# Patient Record
Sex: MALE | Race: WHITE | NOT HISPANIC OR LATINO
[De-identification: names, ages, dates, MRNs, and addresses within clinical notes are randomized per-mention and may not be internally consistent; named-entity substitution may affect disease eponyms.]

---

## 2017-01-30 ENCOUNTER — APPOINTMENT (OUTPATIENT)
Dept: SURGERY | Facility: CLINIC | Age: 77
End: 2017-01-30

## 2017-01-30 VITALS
SYSTOLIC BLOOD PRESSURE: 131 MMHG | WEIGHT: 183 LBS | TEMPERATURE: 97.5 F | OXYGEN SATURATION: 97 % | DIASTOLIC BLOOD PRESSURE: 77 MMHG | HEART RATE: 73 BPM | HEIGHT: 72 IN | BODY MASS INDEX: 24.79 KG/M2

## 2017-02-21 ENCOUNTER — EMERGENCY (EMERGENCY)
Facility: HOSPITAL | Age: 77
LOS: 1 days | Discharge: HOME | End: 2017-02-21
Attending: EMERGENCY MEDICINE | Admitting: EMERGENCY MEDICINE
Payer: MEDICARE

## 2017-02-21 VITALS
RESPIRATION RATE: 18 BRPM | DIASTOLIC BLOOD PRESSURE: 72 MMHG | OXYGEN SATURATION: 98 % | SYSTOLIC BLOOD PRESSURE: 125 MMHG | HEART RATE: 76 BPM | TEMPERATURE: 98 F

## 2017-02-21 VITALS
RESPIRATION RATE: 17 BRPM | DIASTOLIC BLOOD PRESSURE: 78 MMHG | OXYGEN SATURATION: 97 % | SYSTOLIC BLOOD PRESSURE: 132 MMHG | HEART RATE: 85 BPM | WEIGHT: 175.05 LBS | TEMPERATURE: 97 F

## 2017-02-21 DIAGNOSIS — Z88.8 ALLERGY STATUS TO OTHER DRUGS, MEDICAMENTS AND BIOLOGICAL SUBSTANCES STATUS: ICD-10-CM

## 2017-02-21 DIAGNOSIS — L20.9 ATOPIC DERMATITIS, UNSPECIFIED: ICD-10-CM

## 2017-02-21 DIAGNOSIS — Z95.828 PRESENCE OF OTHER VASCULAR IMPLANTS AND GRAFTS: Chronic | ICD-10-CM

## 2017-02-21 DIAGNOSIS — Z98.89 OTHER SPECIFIED POSTPROCEDURAL STATES: Chronic | ICD-10-CM

## 2017-02-21 DIAGNOSIS — Z88.1 ALLERGY STATUS TO OTHER ANTIBIOTIC AGENTS STATUS: ICD-10-CM

## 2017-02-21 DIAGNOSIS — Z88.0 ALLERGY STATUS TO PENICILLIN: ICD-10-CM

## 2017-02-21 DIAGNOSIS — Z79.891 LONG TERM (CURRENT) USE OF OPIATE ANALGESIC: ICD-10-CM

## 2017-02-21 DIAGNOSIS — R21 RASH AND OTHER NONSPECIFIC SKIN ERUPTION: ICD-10-CM

## 2017-02-21 DIAGNOSIS — Z79.899 OTHER LONG TERM (CURRENT) DRUG THERAPY: ICD-10-CM

## 2017-02-21 PROCEDURE — 99282 EMERGENCY DEPT VISIT SF MDM: CPT

## 2017-02-21 NOTE — ED PROVIDER NOTE - SKIN, MLM
L hand: a 4 cm area of thin, hyperpigmented skin to dorsum on hand - consistent w/atopic dermatitis, no swelling, no warmth, no tend, no vesicles, no pustules, no bleeding; a 2 mm scab to R lateral knee w/o swelling, pus, no SJS, no TENS, no target lesions

## 2017-02-21 NOTE — ED PROVIDER NOTE - MEDICAL DECISION MAKING DETAILS
pt w/a rash to L hand - consistent w/atopic dermatitis, no signs of inf, likely result of using latex gloves frequently - advised to use hydrocortisone cr, stop latex glove use and f/u w/derm

## 2017-02-21 NOTE — ED PROVIDER NOTE - OBJECTIVE STATEMENT
The pt is a 75 y/o M, who presents to ED c/o rash to L hand x few wks. States that has been wearing latex gloves a lot because is a germanophobe, rash itchy at times, admits to scratching it at night time.  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 Denies fevers, chills, pus, bleeding, rash to body, has not seen derm for the rash

## 2017-02-21 NOTE — ED ADULT TRIAGE NOTE - CHIEF COMPLAINT QUOTE
c/o rash to left hand x 2 weeks.  No open wounds. Denies fever, chills, /GI discomfort, sob, chest pain.  Hx: MRSA to groin 10/2016
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
## 2017-02-21 NOTE — ED ADULT NURSE NOTE - CHPI ED SYMPTOMS NEG
no bruising/no pain/no inflammation/no decreased eating/drinking/no fever/no petechia/no chills/no vomiting

## 2017-03-01 ENCOUNTER — APPOINTMENT (OUTPATIENT)
Dept: VASCULAR SURGERY | Facility: CLINIC | Age: 77
End: 2017-03-01

## 2017-03-01 ENCOUNTER — OUTPATIENT (OUTPATIENT)
Dept: OUTPATIENT SERVICES | Facility: HOSPITAL | Age: 77
LOS: 1 days | End: 2017-03-01
Payer: MEDICARE

## 2017-03-01 VITALS
SYSTOLIC BLOOD PRESSURE: 123 MMHG | HEART RATE: 99 BPM | BODY MASS INDEX: 24.79 KG/M2 | OXYGEN SATURATION: 97 % | DIASTOLIC BLOOD PRESSURE: 85 MMHG | HEIGHT: 72 IN | WEIGHT: 183 LBS

## 2017-03-01 DIAGNOSIS — Z95.828 PRESENCE OF OTHER VASCULAR IMPLANTS AND GRAFTS: Chronic | ICD-10-CM

## 2017-03-01 DIAGNOSIS — Z95.828 PRESENCE OF OTHER VASCULAR IMPLANTS AND GRAFTS: ICD-10-CM

## 2017-03-01 DIAGNOSIS — Z98.89 OTHER SPECIFIED POSTPROCEDURAL STATES: Chronic | ICD-10-CM

## 2017-03-01 PROCEDURE — 73660 X-RAY EXAM OF TOE(S): CPT

## 2017-03-01 PROCEDURE — 73660 X-RAY EXAM OF TOE(S): CPT | Mod: 26,LT

## 2017-03-06 ENCOUNTER — OUTPATIENT (OUTPATIENT)
Dept: OUTPATIENT SERVICES | Facility: HOSPITAL | Age: 77
LOS: 1 days | End: 2017-03-06
Payer: MEDICARE

## 2017-03-06 DIAGNOSIS — Z95.828 PRESENCE OF OTHER VASCULAR IMPLANTS AND GRAFTS: Chronic | ICD-10-CM

## 2017-03-06 DIAGNOSIS — D69.6 THROMBOCYTOPENIA, UNSPECIFIED: ICD-10-CM

## 2017-03-06 DIAGNOSIS — D68.9 COAGULATION DEFECT, UNSPECIFIED: ICD-10-CM

## 2017-03-06 DIAGNOSIS — Z98.89 OTHER SPECIFIED POSTPROCEDURAL STATES: Chronic | ICD-10-CM

## 2017-03-06 DIAGNOSIS — Z86.711 PERSONAL HISTORY OF PULMONARY EMBOLISM: ICD-10-CM

## 2017-03-06 DIAGNOSIS — D64.9 ANEMIA, UNSPECIFIED: ICD-10-CM

## 2017-03-06 LAB
APTT BLD: 39 SEC — HIGH (ref 27.5–37.4)
BASOPHILS NFR BLD AUTO: 0.5 % — SIGNIFICANT CHANGE UP (ref 0–2)
EOSINOPHIL NFR BLD AUTO: 2.8 % — SIGNIFICANT CHANGE UP (ref 0–6)
HCT VFR BLD CALC: 45.1 % — SIGNIFICANT CHANGE UP (ref 39–50)
HGB BLD-MCNC: 15.1 G/DL — SIGNIFICANT CHANGE UP (ref 13–17)
INR BLD: 1.27 — HIGH (ref 0.88–1.16)
LYMPHOCYTES # BLD AUTO: 31.4 % — SIGNIFICANT CHANGE UP (ref 13–44)
MCHC RBC-ENTMCNC: 31.4 PG — SIGNIFICANT CHANGE UP (ref 27–34)
MCHC RBC-ENTMCNC: 33.5 G/DL — SIGNIFICANT CHANGE UP (ref 32–36)
MCV RBC AUTO: 93.8 FL — SIGNIFICANT CHANGE UP (ref 80–100)
MONOCYTES NFR BLD AUTO: 10.1 % — SIGNIFICANT CHANGE UP (ref 2–14)
NEUTROPHILS NFR BLD AUTO: 55.2 % — SIGNIFICANT CHANGE UP (ref 43–77)
PLATELET # BLD AUTO: 152 K/UL — SIGNIFICANT CHANGE UP (ref 150–400)
PROTHROM AB SERPL-ACNC: 14.1 SEC — HIGH (ref 10–13.1)
RBC # BLD: 4.81 M/UL — SIGNIFICANT CHANGE UP (ref 4.2–5.8)
RBC # BLD: 4.81 M/UL — SIGNIFICANT CHANGE UP (ref 4.2–5.8)
RBC # FLD: 13.4 % — SIGNIFICANT CHANGE UP (ref 10.3–16.9)
RETICS/RBC NFR: 0.6 % — SIGNIFICANT CHANGE UP (ref 0.5–2.5)
WBC # BLD: 3.9 K/UL — SIGNIFICANT CHANGE UP (ref 3.8–10.5)
WBC # FLD AUTO: 3.9 K/UL — SIGNIFICANT CHANGE UP (ref 3.8–10.5)

## 2017-03-06 PROCEDURE — 85598 HEXAGNAL PHOSPH PLTLT NEUTRL: CPT

## 2017-03-06 PROCEDURE — 85730 THROMBOPLASTIN TIME PARTIAL: CPT

## 2017-03-06 PROCEDURE — 85045 AUTOMATED RETICULOCYTE COUNT: CPT

## 2017-03-06 PROCEDURE — 85025 COMPLETE CBC W/AUTO DIFF WBC: CPT

## 2017-03-06 PROCEDURE — 36415 COLL VENOUS BLD VENIPUNCTURE: CPT

## 2017-03-06 PROCEDURE — 85610 PROTHROMBIN TIME: CPT

## 2017-03-07 LAB
EOSINOPHIL NFR BLD AUTO: 3 % — SIGNIFICANT CHANGE UP (ref 0–6)
LYMPHOCYTES # BLD AUTO: 24 % — SIGNIFICANT CHANGE UP (ref 13–44)
MANUAL DIF COMMENT BLD-IMP: SIGNIFICANT CHANGE UP
MANUAL SMEAR VERIFICATION: YES — SIGNIFICANT CHANGE UP
MONOCYTES NFR BLD AUTO: 9 % — SIGNIFICANT CHANGE UP (ref 2–14)
NEUTROPHILS NFR BLD AUTO: 64 % — SIGNIFICANT CHANGE UP (ref 43–77)
PLAT MORPH BLD: NORMAL — SIGNIFICANT CHANGE UP
RBC BLD AUTO: NORMAL — SIGNIFICANT CHANGE UP

## 2017-03-08 LAB
CONFIRM APTT STACLOT: NEGATIVE — SIGNIFICANT CHANGE UP
DRVVT SCREEN TO CONFIRM RATIO: SIGNIFICANT CHANGE UP
LA NT DPL PPP QL: 23.5 SEC — SIGNIFICANT CHANGE UP

## 2017-03-15 ENCOUNTER — FORM ENCOUNTER (OUTPATIENT)
Age: 77
End: 2017-03-15

## 2017-03-16 ENCOUNTER — APPOINTMENT (OUTPATIENT)
Dept: VASCULAR SURGERY | Facility: CLINIC | Age: 77
End: 2017-03-16
Payer: MEDICARE

## 2017-03-16 ENCOUNTER — OUTPATIENT (OUTPATIENT)
Dept: OUTPATIENT SERVICES | Facility: HOSPITAL | Age: 77
LOS: 1 days | End: 2017-03-16
Payer: MEDICARE

## 2017-03-16 DIAGNOSIS — Z98.89 OTHER SPECIFIED POSTPROCEDURAL STATES: Chronic | ICD-10-CM

## 2017-03-16 DIAGNOSIS — Z95.828 PRESENCE OF OTHER VASCULAR IMPLANTS AND GRAFTS: Chronic | ICD-10-CM

## 2017-03-16 PROCEDURE — 74174 CTA ABD&PLVS W/CONTRAST: CPT

## 2017-03-16 PROCEDURE — 74177 CT ABD & PELVIS W/CONTRAST: CPT | Mod: 26

## 2017-03-16 PROCEDURE — 74174 CTA ABD&PLVS W/CONTRAST: CPT | Mod: 26

## 2017-03-16 PROCEDURE — 93970 EXTREMITY STUDY: CPT

## 2017-03-20 ENCOUNTER — OTHER (OUTPATIENT)
Age: 77
End: 2017-03-20

## 2017-04-03 ENCOUNTER — FORM ENCOUNTER (OUTPATIENT)
Age: 77
End: 2017-04-03

## 2017-04-04 ENCOUNTER — OUTPATIENT (OUTPATIENT)
Dept: OUTPATIENT SERVICES | Facility: HOSPITAL | Age: 77
LOS: 1 days | End: 2017-04-04
Payer: MEDICARE

## 2017-04-04 DIAGNOSIS — Z98.89 OTHER SPECIFIED POSTPROCEDURAL STATES: Chronic | ICD-10-CM

## 2017-04-04 DIAGNOSIS — Z95.828 PRESENCE OF OTHER VASCULAR IMPLANTS AND GRAFTS: Chronic | ICD-10-CM

## 2017-04-04 PROCEDURE — 71250 CT THORAX DX C-: CPT

## 2017-04-04 PROCEDURE — 71250 CT THORAX DX C-: CPT | Mod: 26

## 2017-04-20 ENCOUNTER — APPOINTMENT (OUTPATIENT)
Dept: SURGERY | Facility: CLINIC | Age: 77
End: 2017-04-20

## 2017-06-05 ENCOUNTER — APPOINTMENT (OUTPATIENT)
Dept: SURGERY | Facility: CLINIC | Age: 77
End: 2017-06-05

## 2017-06-05 VITALS
WEIGHT: 185 LBS | HEIGHT: 72 IN | HEART RATE: 79 BPM | BODY MASS INDEX: 25.06 KG/M2 | OXYGEN SATURATION: 96 % | DIASTOLIC BLOOD PRESSURE: 85 MMHG | SYSTOLIC BLOOD PRESSURE: 138 MMHG

## 2017-06-05 DIAGNOSIS — K46.0 UNSPECIFIED ABDOMINAL HERNIA WITH OBSTRUCTION, W/OUT GANGRENE: ICD-10-CM

## 2017-06-15 ENCOUNTER — APPOINTMENT (OUTPATIENT)
Dept: THORACIC SURGERY | Facility: CLINIC | Age: 77
End: 2017-06-15

## 2017-06-15 VITALS
HEIGHT: 72 IN | OXYGEN SATURATION: 99 % | SYSTOLIC BLOOD PRESSURE: 152 MMHG | BODY MASS INDEX: 24.38 KG/M2 | HEART RATE: 60 BPM | DIASTOLIC BLOOD PRESSURE: 87 MMHG | RESPIRATION RATE: 17 BRPM | WEIGHT: 180 LBS | TEMPERATURE: 97.5 F

## 2017-06-19 ENCOUNTER — FORM ENCOUNTER (OUTPATIENT)
Age: 77
End: 2017-06-19

## 2017-06-20 ENCOUNTER — OUTPATIENT (OUTPATIENT)
Dept: OUTPATIENT SERVICES | Facility: HOSPITAL | Age: 77
LOS: 1 days | End: 2017-06-20
Payer: MEDICARE

## 2017-06-20 DIAGNOSIS — Z95.828 PRESENCE OF OTHER VASCULAR IMPLANTS AND GRAFTS: Chronic | ICD-10-CM

## 2017-06-20 DIAGNOSIS — Z98.89 OTHER SPECIFIED POSTPROCEDURAL STATES: Chronic | ICD-10-CM

## 2017-06-20 PROCEDURE — A9552: CPT

## 2017-06-20 PROCEDURE — 78815 PET IMAGE W/CT SKULL-THIGH: CPT | Mod: 26

## 2017-06-20 PROCEDURE — 78815 PET IMAGE W/CT SKULL-THIGH: CPT

## 2017-07-07 ENCOUNTER — RESULT REVIEW (OUTPATIENT)
Age: 77
End: 2017-07-07

## 2017-07-07 ENCOUNTER — MESSAGE (OUTPATIENT)
Age: 77
End: 2017-07-07

## 2017-08-23 ENCOUNTER — APPOINTMENT (OUTPATIENT)
Dept: UROLOGY | Facility: CLINIC | Age: 77
End: 2017-08-23
Payer: MEDICARE

## 2017-08-23 VITALS
TEMPERATURE: 97.4 F | DIASTOLIC BLOOD PRESSURE: 85 MMHG | BODY MASS INDEX: 24.38 KG/M2 | HEART RATE: 72 BPM | WEIGHT: 180 LBS | SYSTOLIC BLOOD PRESSURE: 134 MMHG | HEIGHT: 72 IN

## 2017-08-23 DIAGNOSIS — N39.45 CONTINUOUS LEAKAGE: ICD-10-CM

## 2017-08-23 PROCEDURE — 51798 US URINE CAPACITY MEASURE: CPT

## 2017-08-23 PROCEDURE — 99204 OFFICE O/P NEW MOD 45 MIN: CPT | Mod: 25

## 2017-08-23 RX ORDER — OXYBUTYNIN CHLORIDE 10 MG/1
10 TABLET, EXTENDED RELEASE ORAL
Qty: 30 | Refills: 5 | Status: DISCONTINUED | COMMUNITY
Start: 2017-08-23 | End: 2017-08-23

## 2017-08-24 LAB
APPEARANCE: CLEAR
BACTERIA: ABNORMAL
BILIRUBIN URINE: NEGATIVE
BLOOD URINE: NEGATIVE
CALCIUM OXALATE CRYSTALS: ABNORMAL
COLOR: YELLOW
GLUCOSE QUALITATIVE U: NORMAL MG/DL
HYALINE CASTS: 1 /LPF
KETONES URINE: NEGATIVE
LEUKOCYTE ESTERASE URINE: NEGATIVE
MICROSCOPIC-UA: NORMAL
NITRITE URINE: NEGATIVE
PH URINE: 5
PROTEIN URINE: NEGATIVE MG/DL
RED BLOOD CELLS URINE: 3 /HPF
SPECIFIC GRAVITY URINE: 1.02
SQUAMOUS EPITHELIAL CELLS: 2 /HPF
UROBILINOGEN URINE: NORMAL MG/DL
WHITE BLOOD CELLS URINE: 3 /HPF

## 2017-08-25 LAB — BACTERIA UR CULT: NORMAL

## 2017-08-27 ENCOUNTER — TRANSCRIPTION ENCOUNTER (OUTPATIENT)
Age: 77
End: 2017-08-27

## 2017-09-18 ENCOUNTER — APPOINTMENT (OUTPATIENT)
Dept: SURGERY | Facility: CLINIC | Age: 77
End: 2017-09-18

## 2017-09-27 ENCOUNTER — APPOINTMENT (OUTPATIENT)
Dept: HEART AND VASCULAR | Facility: CLINIC | Age: 77
End: 2017-09-27
Payer: MEDICARE

## 2017-09-27 VITALS
DIASTOLIC BLOOD PRESSURE: 66 MMHG | TEMPERATURE: 97.5 F | SYSTOLIC BLOOD PRESSURE: 116 MMHG | HEART RATE: 66 BPM | WEIGHT: 183 LBS | HEIGHT: 72 IN | OXYGEN SATURATION: 95 % | RESPIRATION RATE: 12 BRPM | BODY MASS INDEX: 24.79 KG/M2

## 2017-09-27 PROCEDURE — 99214 OFFICE O/P EST MOD 30 MIN: CPT | Mod: 25

## 2017-09-27 PROCEDURE — 93000 ELECTROCARDIOGRAM COMPLETE: CPT

## 2017-09-27 RX ORDER — UBIDECARENONE/VIT E ACET 100MG-5
25 MCG CAPSULE ORAL
Refills: 0 | Status: ACTIVE | COMMUNITY

## 2017-09-27 RX ORDER — PYRIDOXINE HCL (VITAMIN B6) 100 MG
100 TABLET ORAL
Refills: 0 | Status: ACTIVE | COMMUNITY

## 2017-09-27 RX ORDER — CYANOCOBALAMIN (VITAMIN B-12) 100 MCG
100 TABLET ORAL
Refills: 0 | Status: ACTIVE | COMMUNITY

## 2017-09-28 ENCOUNTER — TRANSCRIPTION ENCOUNTER (OUTPATIENT)
Age: 77
End: 2017-09-28

## 2017-09-29 ENCOUNTER — TRANSCRIPTION ENCOUNTER (OUTPATIENT)
Age: 77
End: 2017-09-29

## 2017-09-29 ENCOUNTER — RX RENEWAL (OUTPATIENT)
Age: 77
End: 2017-09-29

## 2017-10-05 ENCOUNTER — TRANSCRIPTION ENCOUNTER (OUTPATIENT)
Age: 77
End: 2017-10-05

## 2017-10-16 ENCOUNTER — FORM ENCOUNTER (OUTPATIENT)
Age: 77
End: 2017-10-16

## 2017-10-17 ENCOUNTER — OUTPATIENT (OUTPATIENT)
Dept: OUTPATIENT SERVICES | Facility: HOSPITAL | Age: 77
LOS: 1 days | End: 2017-10-17
Payer: MEDICARE

## 2017-10-17 DIAGNOSIS — Z95.828 PRESENCE OF OTHER VASCULAR IMPLANTS AND GRAFTS: Chronic | ICD-10-CM

## 2017-10-17 DIAGNOSIS — Z98.89 OTHER SPECIFIED POSTPROCEDURAL STATES: Chronic | ICD-10-CM

## 2017-10-17 PROCEDURE — 71250 CT THORAX DX C-: CPT | Mod: 26

## 2017-10-17 PROCEDURE — 71250 CT THORAX DX C-: CPT

## 2017-10-30 ENCOUNTER — TRANSCRIPTION ENCOUNTER (OUTPATIENT)
Age: 77
End: 2017-10-30

## 2017-11-01 ENCOUNTER — APPOINTMENT (OUTPATIENT)
Dept: VASCULAR SURGERY | Facility: CLINIC | Age: 77
End: 2017-11-01
Payer: MEDICARE

## 2017-11-01 VITALS — DIASTOLIC BLOOD PRESSURE: 82 MMHG | SYSTOLIC BLOOD PRESSURE: 121 MMHG | HEART RATE: 72 BPM | OXYGEN SATURATION: 94 %

## 2017-11-01 PROCEDURE — 99213 OFFICE O/P EST LOW 20 MIN: CPT

## 2017-11-17 ENCOUNTER — TRANSCRIPTION ENCOUNTER (OUTPATIENT)
Age: 77
End: 2017-11-17

## 2017-12-05 ENCOUNTER — TRANSCRIPTION ENCOUNTER (OUTPATIENT)
Age: 77
End: 2017-12-05

## 2018-03-05 ENCOUNTER — APPOINTMENT (OUTPATIENT)
Dept: SURGERY | Facility: CLINIC | Age: 78
End: 2018-03-05
Payer: MEDICARE

## 2018-03-05 VITALS
OXYGEN SATURATION: 97 % | HEIGHT: 72 IN | WEIGHT: 177 LBS | HEART RATE: 60 BPM | BODY MASS INDEX: 23.98 KG/M2 | SYSTOLIC BLOOD PRESSURE: 127 MMHG | TEMPERATURE: 97.4 F | DIASTOLIC BLOOD PRESSURE: 77 MMHG

## 2018-03-05 DIAGNOSIS — B35.6 TINEA CRURIS: ICD-10-CM

## 2018-03-05 PROCEDURE — 99214 OFFICE O/P EST MOD 30 MIN: CPT

## 2018-04-03 PROBLEM — B35.6 TINEA CRURIS: Status: ACTIVE | Noted: 2018-03-05

## 2018-05-03 ENCOUNTER — FORM ENCOUNTER (OUTPATIENT)
Age: 78
End: 2018-05-03

## 2018-05-04 ENCOUNTER — OUTPATIENT (OUTPATIENT)
Dept: OUTPATIENT SERVICES | Facility: HOSPITAL | Age: 78
LOS: 1 days | End: 2018-05-04
Payer: MEDICARE

## 2018-05-04 ENCOUNTER — APPOINTMENT (OUTPATIENT)
Dept: CT IMAGING | Facility: HOSPITAL | Age: 78
End: 2018-05-04
Payer: MEDICARE

## 2018-05-04 DIAGNOSIS — Z98.89 OTHER SPECIFIED POSTPROCEDURAL STATES: Chronic | ICD-10-CM

## 2018-05-04 DIAGNOSIS — Z95.828 PRESENCE OF OTHER VASCULAR IMPLANTS AND GRAFTS: Chronic | ICD-10-CM

## 2018-05-04 PROCEDURE — 71250 CT THORAX DX C-: CPT

## 2018-05-04 PROCEDURE — 71250 CT THORAX DX C-: CPT | Mod: 26

## 2018-05-23 ENCOUNTER — APPOINTMENT (OUTPATIENT)
Dept: HEART AND VASCULAR | Facility: CLINIC | Age: 78
End: 2018-05-23
Payer: MEDICARE

## 2018-05-23 VITALS
DIASTOLIC BLOOD PRESSURE: 68 MMHG | HEIGHT: 72 IN | HEART RATE: 44 BPM | SYSTOLIC BLOOD PRESSURE: 147 MMHG | WEIGHT: 177 LBS | BODY MASS INDEX: 23.98 KG/M2

## 2018-05-23 PROCEDURE — 93000 ELECTROCARDIOGRAM COMPLETE: CPT

## 2018-05-23 PROCEDURE — 99205 OFFICE O/P NEW HI 60 MIN: CPT | Mod: 25

## 2018-05-23 RX ORDER — CLOTRIMAZOLE AND BETAMETHASONE DIPROPIONATE 10; .5 MG/G; MG/G
1-0.05 CREAM TOPICAL TWICE DAILY
Qty: 1 | Refills: 2 | Status: DISCONTINUED | COMMUNITY
Start: 2017-02-07 | End: 2018-05-23

## 2018-06-06 ENCOUNTER — APPOINTMENT (OUTPATIENT)
Dept: VASCULAR SURGERY | Facility: CLINIC | Age: 78
End: 2018-06-06
Payer: MEDICARE

## 2018-06-06 PROCEDURE — 93979 VASCULAR STUDY: CPT

## 2018-06-06 PROCEDURE — 99214 OFFICE O/P EST MOD 30 MIN: CPT | Mod: 25

## 2018-06-07 ENCOUNTER — OTHER (OUTPATIENT)
Age: 78
End: 2018-06-07

## 2018-06-13 ENCOUNTER — FORM ENCOUNTER (OUTPATIENT)
Age: 78
End: 2018-06-13

## 2018-06-14 ENCOUNTER — APPOINTMENT (OUTPATIENT)
Dept: CT IMAGING | Facility: HOSPITAL | Age: 78
End: 2018-06-14
Payer: MEDICARE

## 2018-06-14 ENCOUNTER — OUTPATIENT (OUTPATIENT)
Dept: OUTPATIENT SERVICES | Facility: HOSPITAL | Age: 78
LOS: 1 days | End: 2018-06-14
Payer: MEDICARE

## 2018-06-14 ENCOUNTER — APPOINTMENT (OUTPATIENT)
Dept: VASCULAR SURGERY | Facility: CLINIC | Age: 78
End: 2018-06-14
Payer: MEDICARE

## 2018-06-14 DIAGNOSIS — Z95.828 PRESENCE OF OTHER VASCULAR IMPLANTS AND GRAFTS: Chronic | ICD-10-CM

## 2018-06-14 DIAGNOSIS — Z98.89 OTHER SPECIFIED POSTPROCEDURAL STATES: Chronic | ICD-10-CM

## 2018-06-14 PROCEDURE — 74150 CT ABDOMEN W/O CONTRAST: CPT

## 2018-06-14 PROCEDURE — 74150 CT ABDOMEN W/O CONTRAST: CPT | Mod: 26

## 2018-06-14 PROCEDURE — 93970 EXTREMITY STUDY: CPT

## 2018-06-25 ENCOUNTER — RX RENEWAL (OUTPATIENT)
Age: 78
End: 2018-06-25

## 2018-06-25 DIAGNOSIS — R21 RASH AND OTHER NONSPECIFIC SKIN ERUPTION: ICD-10-CM

## 2018-06-25 DIAGNOSIS — K59.00 CONSTIPATION, UNSPECIFIED: ICD-10-CM

## 2018-06-25 DIAGNOSIS — K60.2 ANAL FISSURE, UNSPECIFIED: ICD-10-CM

## 2018-07-02 ENCOUNTER — OTHER (OUTPATIENT)
Age: 78
End: 2018-07-02

## 2018-07-02 DIAGNOSIS — R82.99 OTHER ABNORMAL FINDINGS IN URINE: ICD-10-CM

## 2018-07-03 ENCOUNTER — APPOINTMENT (OUTPATIENT)
Dept: PULMONOLOGY | Facility: CLINIC | Age: 78
End: 2018-07-03
Payer: MEDICARE

## 2018-07-03 DIAGNOSIS — R91.1 SOLITARY PULMONARY NODULE: ICD-10-CM

## 2018-07-03 DIAGNOSIS — G47.33 OBSTRUCTIVE SLEEP APNEA (ADULT) (PEDIATRIC): ICD-10-CM

## 2018-07-03 DIAGNOSIS — J44.9 CHRONIC OBSTRUCTIVE PULMONARY DISEASE, UNSPECIFIED: ICD-10-CM

## 2018-07-03 PROCEDURE — 94729 DIFFUSING CAPACITY: CPT

## 2018-07-03 PROCEDURE — 94010 BREATHING CAPACITY TEST: CPT | Mod: 25

## 2018-07-03 PROCEDURE — 94060 EVALUATION OF WHEEZING: CPT

## 2018-07-03 PROCEDURE — 99215 OFFICE O/P EST HI 40 MIN: CPT | Mod: 25

## 2018-07-03 PROCEDURE — 94727 GAS DIL/WSHOT DETER LNG VOL: CPT

## 2018-07-04 PROBLEM — R91.1 PULMONARY NODULE, RIGHT: Status: ACTIVE | Noted: 2017-06-15

## 2018-07-04 PROBLEM — J44.9 OBSTRUCTIVE AIRWAY DISEASE: Status: ACTIVE | Noted: 2018-07-04

## 2018-07-04 PROBLEM — G47.33 OBSTRUCTIVE SLEEP APNEA: Status: ACTIVE | Noted: 2018-07-04

## 2018-07-17 LAB
ALBUMIN SERPL ELPH-MCNC: 4.4 G/DL
ALP BLD-CCNC: 73 U/L
ALT SERPL-CCNC: 8 U/L
ANION GAP SERPL CALC-SCNC: 15 MMOL/L
APPEARANCE: CLEAR
AST SERPL-CCNC: 12 U/L
BACTERIA UR CULT: NORMAL
BACTERIA: NEGATIVE
BILIRUB SERPL-MCNC: 0.6 MG/DL
BILIRUBIN URINE: NEGATIVE
BLOOD URINE: NEGATIVE
BUN SERPL-MCNC: 14 MG/DL
CALCIUM SERPL-MCNC: 10.2 MG/DL
CHLORIDE SERPL-SCNC: 104 MMOL/L
CO2 SERPL-SCNC: 27 MMOL/L
COLOR: YELLOW
CREAT SERPL-MCNC: 0.71 MG/DL
CREAT SPEC-SCNC: 61 MG/DL
CREAT/PROT UR: 0.1 RATIO
GLUCOSE QUALITATIVE U: NEGATIVE MG/DL
GLUCOSE SERPL-MCNC: 75 MG/DL
HYALINE CASTS: 0 /LPF
KETONES URINE: NEGATIVE
LEUKOCYTE ESTERASE URINE: NEGATIVE
MICROSCOPIC-UA: NORMAL
NITRITE URINE: NEGATIVE
PH URINE: 5
POTASSIUM SERPL-SCNC: 4.1 MMOL/L
PROT SERPL-MCNC: 6.8 G/DL
PROT UR-MCNC: 5 MG/DL
PROTEIN URINE: NEGATIVE MG/DL
RED BLOOD CELLS URINE: 2 /HPF
SODIUM SERPL-SCNC: 146 MMOL/L
SPECIFIC GRAVITY URINE: 1.01
SQUAMOUS EPITHELIAL CELLS: 0 /HPF
UROBILINOGEN URINE: NEGATIVE MG/DL
WHITE BLOOD CELLS URINE: 3 /HPF

## 2018-07-20 ENCOUNTER — RECORD ABSTRACTING (OUTPATIENT)
Age: 78
End: 2018-07-20

## 2018-07-20 DIAGNOSIS — J70.5 RESPIRATORY CONDITIONS DUE TO SMOKE INHALATION: ICD-10-CM

## 2018-07-20 DIAGNOSIS — Z86.79 PERSONAL HISTORY OF OTHER DISEASES OF THE CIRCULATORY SYSTEM: ICD-10-CM

## 2018-07-20 DIAGNOSIS — Z87.01 PERSONAL HISTORY OF PNEUMONIA (RECURRENT): ICD-10-CM

## 2018-07-20 DIAGNOSIS — N40.0 BENIGN PROSTATIC HYPERPLASIA WITHOUT LOWER URINARY TRACT SYMPMS: ICD-10-CM

## 2018-07-23 ENCOUNTER — FORM ENCOUNTER (OUTPATIENT)
Age: 78
End: 2018-07-23

## 2018-07-24 ENCOUNTER — OUTPATIENT (OUTPATIENT)
Dept: OUTPATIENT SERVICES | Facility: HOSPITAL | Age: 78
LOS: 1 days | End: 2018-07-24
Payer: MEDICARE

## 2018-07-24 DIAGNOSIS — Z98.89 OTHER SPECIFIED POSTPROCEDURAL STATES: Chronic | ICD-10-CM

## 2018-07-24 DIAGNOSIS — I26.99 OTHER PULMONARY EMBOLISM WITHOUT ACUTE COR PULMONALE: ICD-10-CM

## 2018-07-24 DIAGNOSIS — Z95.828 PRESENCE OF OTHER VASCULAR IMPLANTS AND GRAFTS: Chronic | ICD-10-CM

## 2018-07-24 PROCEDURE — 93306 TTE W/DOPPLER COMPLETE: CPT

## 2018-07-24 PROCEDURE — 93306 TTE W/DOPPLER COMPLETE: CPT | Mod: 26

## 2018-07-26 ENCOUNTER — RX RENEWAL (OUTPATIENT)
Age: 78
End: 2018-07-26

## 2018-07-27 ENCOUNTER — RESULT REVIEW (OUTPATIENT)
Age: 78
End: 2018-07-27

## 2018-08-17 ENCOUNTER — RESULT REVIEW (OUTPATIENT)
Age: 78
End: 2018-08-17

## 2018-09-14 ENCOUNTER — MEDICATION RENEWAL (OUTPATIENT)
Age: 78
End: 2018-09-14

## 2018-09-18 LAB
ACID FAST STN SPT: ABNORMAL
ACID FAST STN SPT: NORMAL
ACID FAST STN SPT: NORMAL

## 2018-12-10 ENCOUNTER — APPOINTMENT (OUTPATIENT)
Dept: SURGERY | Facility: CLINIC | Age: 78
End: 2018-12-10

## 2019-03-05 ENCOUNTER — RESULT REVIEW (OUTPATIENT)
Age: 79
End: 2019-03-05

## 2019-05-17 ENCOUNTER — TRANSCRIPTION ENCOUNTER (OUTPATIENT)
Age: 79
End: 2019-05-17

## 2019-05-23 ENCOUNTER — APPOINTMENT (OUTPATIENT)
Dept: SURGERY | Facility: CLINIC | Age: 79
End: 2019-05-23
Payer: MEDICARE

## 2019-05-23 PROCEDURE — 99213 OFFICE O/P EST LOW 20 MIN: CPT

## 2019-06-06 ENCOUNTER — RX RENEWAL (OUTPATIENT)
Age: 79
End: 2019-06-06

## 2019-06-11 ENCOUNTER — RESULT REVIEW (OUTPATIENT)
Age: 79
End: 2019-06-11

## 2019-06-20 ENCOUNTER — APPOINTMENT (OUTPATIENT)
Dept: HEART AND VASCULAR | Facility: CLINIC | Age: 79
End: 2019-06-20

## 2021-06-14 ENCOUNTER — APPOINTMENT (OUTPATIENT)
Dept: SURGERY | Facility: CLINIC | Age: 81
End: 2021-06-14
Payer: MEDICARE

## 2021-06-14 VITALS
HEIGHT: 72 IN | WEIGHT: 149 LBS | OXYGEN SATURATION: 98 % | HEART RATE: 75 BPM | TEMPERATURE: 97.2 F | SYSTOLIC BLOOD PRESSURE: 126 MMHG | DIASTOLIC BLOOD PRESSURE: 86 MMHG | BODY MASS INDEX: 20.18 KG/M2

## 2021-06-14 PROCEDURE — 99213 OFFICE O/P EST LOW 20 MIN: CPT

## 2021-06-14 PROCEDURE — 99072 ADDL SUPL MATRL&STAF TM PHE: CPT

## 2021-06-15 NOTE — HISTORY OF PRESENT ILLNESS
[de-identified] : The patient is a 78 year old male with hx of left inguinal hernia repair. States that as of recently cough and bowel movements started to make him notice a non painful bulge on the right groin but the left groin he only feels slight discomfortable sensation with cough only. He has a history of colonoscopy 5 years ago during which polyps were discovered. Denies fevers, chills, nausea, vomiting, SOB and no urinary or bowel changes. Has been following up with Dr. Morales regarding overactive bladder and has been on Eliquis ever since his PE.  [de-identified] : 5-- Patient is a now 77-year-old male well-known to me. As above status post debridement of necrotizing soft tissue infection of the left groin and primary left inguinal hernia repair. He continues to have multiple medical comorbid conditions however has overall improved. His main concern now is that of his financial health. He reports that he is having trouble finding work. He see her for a checkup of his known right inguinal hernia. He denies any fevers chills or shortness of breath. He denies any overt pain or symptomatology associated with a right inguinal hernia. He continues to have skin issues that he intermittently self treats with his wife of many years. \par \par 6-- Patient returns to office. Continues to have multiple medical comorbid conditions. Overall doing well. Here today to discuss known right inguinal hernia. Denies significant pain, however experiences discomfort when coughing. Reports the hernia enlarges with coughing and sneezing. Reports ongoing issue of "poor bladder control" in which he wears diapers and incontinence pads daily. Reports he has a history of constipation, however it has been well managed with change in diet. Denies fever, chills, nausea, or vomiting.

## 2021-06-15 NOTE — ASSESSMENT
[FreeTextEntry1] : Case discussed with attending, . The patient is a 79 y/o male with a longstanding right inguinal hernia. Patient is well known to . Continues to have many ongoing physical and emotional stressors in his life.\par Patients PCP no longer in his network, have given him a referral for  to establish care. In regards to the hernia, patient seems to be doing well and the hernia does not seem to be causing serious problems. Given the patients medical history, we discussed risk/benefits of pursuing elective repair of hernia. At this time, as he is relatively asymptomatic, he can hold off undergoing elective repair. Discussed with patient the plan for him to establish care with a PCP and follow up with his pulmonologist. Return to office in 6 months. Knows to call the office with any questions/concerns or if he starts to experience new or worsening symptoms. Notably greater than 50% of today's 20 minute follow up visit was spent on counseling and coordination of his care.

## 2021-06-15 NOTE — PHYSICAL EXAM
[Normal Breath Sounds] : Normal breath sounds [Normal Heart Sounds] : normal heart sounds [Alert] : alert [Oriented to Person] : oriented to person [Oriented to Place] : oriented to place [Oriented to Time] : oriented to time [Calm] : calm [Tender] : was nontender [Enlarged] : not enlarged [Purpura] : no purpura  [Petechiae] : no petechiae [de-identified] : NAD. Appropriate.Comfortable. [de-identified] : Pupils equal. No scleral icterus. NCAT. [de-identified] : Supple. No overt lymphadenopathy. No JVD. [de-identified] : Abdomen is soft, nontender and non distended. Do not appreciate any overt mass. There is a reducible right inguinal hernia and well healed left inguinal hernia incision. \par  [de-identified] : testicles descended bilaterally and surgical absence of left testicle

## 2021-07-28 ENCOUNTER — APPOINTMENT (OUTPATIENT)
Dept: UROLOGY | Facility: CLINIC | Age: 81
End: 2021-07-28
Payer: MEDICARE

## 2021-07-28 VITALS — HEART RATE: 80 BPM | TEMPERATURE: 98 F | DIASTOLIC BLOOD PRESSURE: 91 MMHG | SYSTOLIC BLOOD PRESSURE: 157 MMHG

## 2021-07-28 DIAGNOSIS — N39.42 INCONTINENCE W/OUT SENSORY AWARENESS: ICD-10-CM

## 2021-07-28 DIAGNOSIS — N32.81 OVERACTIVE BLADDER: ICD-10-CM

## 2021-07-28 PROCEDURE — 99203 OFFICE O/P NEW LOW 30 MIN: CPT

## 2021-07-28 PROCEDURE — 99072 ADDL SUPL MATRL&STAF TM PHE: CPT

## 2021-07-28 RX ORDER — OXYBUTYNIN CHLORIDE 5 MG/1
5 TABLET ORAL
Qty: 90 | Refills: 5 | Status: DISCONTINUED | COMMUNITY
Start: 2017-08-23 | End: 2021-07-28

## 2021-07-28 NOTE — PHYSICAL EXAM
[Normal Appearance] : normal appearance [General Appearance - In No Acute Distress] : no acute distress [Edema] : no peripheral edema [] : no respiratory distress [Exaggerated Use Of Accessory Muscles For Inspiration] : no accessory muscle use [Abdomen Soft] : soft [Normal Station and Gait] : the gait and station were normal for the patient's age [Skin Color & Pigmentation] : normal skin color and pigmentation [No Focal Deficits] : no focal deficits [Oriented To Time, Place, And Person] : oriented to person, place, and time [No Palpable Adenopathy] : no palpable adenopathy

## 2021-07-29 LAB
APPEARANCE: ABNORMAL
BACTERIA: ABNORMAL
BILIRUBIN URINE: NEGATIVE
BLOOD URINE: ABNORMAL
COLOR: YELLOW
GLUCOSE QUALITATIVE U: NEGATIVE
HYALINE CASTS: 3 /LPF
KETONES URINE: NEGATIVE
LEUKOCYTE ESTERASE URINE: ABNORMAL
MICROSCOPIC-UA: NORMAL
NITRITE URINE: POSITIVE
PH URINE: 6.5
PROTEIN URINE: ABNORMAL
RED BLOOD CELLS URINE: 30 /HPF
SPECIFIC GRAVITY URINE: 1.02
SQUAMOUS EPITHELIAL CELLS: 1 /HPF
UROBILINOGEN URINE: NORMAL
WHITE BLOOD CELLS URINE: >720 /HPF

## 2021-07-29 NOTE — END OF VISIT
Patient presents with foul smell urine, frquency and incontinence with systemic manifestion of N/V, positive UA and CT A/P findings of pyelonehritis. Patient received ceftriaxone and 2 L IV NS bolus in ED    CT Ab/pelvis: There is mild pelvocaliceal prominence on the left with suggestion of mild uroepithelial thickening of the left renal pelvis, this may relate to sequelae of a recently passed calculus however correlation for UTI/pyelonephritis is needed. There is subtle suggestion of heterogeneity of enhancement character of the kidneys bilaterally, this can be seen with UTI/pyelonephritis for which clinical and historical correlation is needed. Indeterminate focus at the mid to upper pole of the right kidney could represent a focal area of pyelonephritis although the possibility of a small solid mass lesion would be difficult to exclude, as discussed above ultrasound follow-up is recommended.Bilateral nonobstructing nephrolithiasis    Renal US: Bilateral decreased perfusion and mild borderline elevated resistive indices, which can be seen in the setting of medical renal disease. Bilateral calcified nephroliths.  No focal solid mass.  The finding of the right kidney noted on CT is not demonstrated sonographically. Mild left hydronephrosis.    PLAN:  - Urine culture and blood cultures X 2 - pending  - Received 1 X dose of  ceftriaxone 1 g IV OD (Previous cultures and sensitivities reviewed). Discharged on Ciprofloxacin 500 mg BID for 7 days for Acute pyelonephritis        [FreeTextEntry3] : 81yo male with severe LUTS including frequency, urgency, incontinence, previously failed oral medications, normal emptying in the past, no documentation of UTI. Complicated PMHx/PSHx. Will refer to pelvic health center for UDS and discussion of alternative options for severe LUTS

## 2021-07-29 NOTE — REVIEW OF SYSTEMS
[Fever] : no fever [Chills] : no chills [see HPI] : see HPI [Dizziness] : dizziness [Negative] : Gastrointestinal

## 2021-07-29 NOTE — ASSESSMENT
[FreeTextEntry1] : 80M with severe overactive bladder and urinary incontinence refractory to medications\par -previous PVR was normal\par -UA and UCx today\par -UDS with Dr. Cartagena
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
## 2021-08-02 ENCOUNTER — NON-APPOINTMENT (OUTPATIENT)
Age: 81
End: 2021-08-02

## 2021-08-02 LAB — BACTERIA UR CULT: ABNORMAL

## 2021-08-24 ENCOUNTER — NON-APPOINTMENT (OUTPATIENT)
Age: 81
End: 2021-08-24

## 2021-08-27 ENCOUNTER — APPOINTMENT (OUTPATIENT)
Dept: UROLOGY | Facility: CLINIC | Age: 81
End: 2021-08-27
Payer: MEDICARE

## 2021-08-27 VITALS — DIASTOLIC BLOOD PRESSURE: 89 MMHG | HEART RATE: 93 BPM | TEMPERATURE: 98.4 F | SYSTOLIC BLOOD PRESSURE: 143 MMHG

## 2021-08-27 DIAGNOSIS — R31.0 GROSS HEMATURIA: ICD-10-CM

## 2021-08-27 DIAGNOSIS — N39.46 MIXED INCONTINENCE: ICD-10-CM

## 2021-08-27 PROCEDURE — 99214 OFFICE O/P EST MOD 30 MIN: CPT

## 2021-08-27 NOTE — ASSESSMENT
[FreeTextEntry1] : \par \par Impression/plan: 80 year-old gentleman presents to the office with gross hematuria for the past month. \par \par PVR 4 mL\par \par 1. Urine c+s and cytology- r/o UTI and abnormal cells in the bladder. \par 2. CT abd/pelvis urogram- hematuria w/u.\par 3. BMP- assess creatinine levels\par 4. F/u UDS and cysto- assess bladder function and bladder structure. \par

## 2021-08-27 NOTE — HISTORY OF PRESENT ILLNESS
[FreeTextEntry1] : 80 year-old gentleman presents to the office today with gross hematuria for the past month. Patient reports a trace amount of blood yesterday. Patient reporting having a UTI 20 or more years ago and took Bactrim and it went away. Patient reports foul smelling urine. Denies fevers, chills, n/v/d, SOB and chest pain. Patient was treated with a 7 day course of Bactrim for a positive urine culture in the beginning of the month. Patient reports increased frequency (q 1.5- 2hr); this is patient's baseline. Patient going through approximately 20 pads ans 5-6 diapers a day. Patient has mixed incontinence; reports that he does not have "bladder control". Patient reports that he has a hard time urinating in the bathroom . Patient reports that he need to "relax my mind" in order to urinate and that he sometimes has to submerge his scrotal are with warm water and then cold water right away to go to the bathroom.\par \par PVR 4 mL

## 2021-08-30 ENCOUNTER — NON-APPOINTMENT (OUTPATIENT)
Age: 81
End: 2021-08-30

## 2021-08-30 LAB
ANION GAP SERPL CALC-SCNC: 16 MMOL/L
BACTERIA UR CULT: NORMAL
BUN SERPL-MCNC: 26 MG/DL
CALCIUM SERPL-MCNC: 10.5 MG/DL
CHLORIDE SERPL-SCNC: 104 MMOL/L
CO2 SERPL-SCNC: 25 MMOL/L
CREAT SERPL-MCNC: 0.66 MG/DL
GLUCOSE SERPL-MCNC: 89 MG/DL
POTASSIUM SERPL-SCNC: 5.1 MMOL/L
SODIUM SERPL-SCNC: 145 MMOL/L

## 2021-08-31 ENCOUNTER — NON-APPOINTMENT (OUTPATIENT)
Age: 81
End: 2021-08-31

## 2021-08-31 LAB — URINE CYTOLOGY: NORMAL

## 2021-09-01 ENCOUNTER — NON-APPOINTMENT (OUTPATIENT)
Age: 81
End: 2021-09-01

## 2021-10-05 ENCOUNTER — RX RENEWAL (OUTPATIENT)
Age: 81
End: 2021-10-05

## 2021-10-29 ENCOUNTER — APPOINTMENT (OUTPATIENT)
Dept: HEART AND VASCULAR | Facility: CLINIC | Age: 81
End: 2021-10-29
Payer: MEDICARE

## 2021-10-29 VITALS
DIASTOLIC BLOOD PRESSURE: 84 MMHG | SYSTOLIC BLOOD PRESSURE: 114 MMHG | BODY MASS INDEX: 18.28 KG/M2 | OXYGEN SATURATION: 95 % | HEART RATE: 74 BPM | TEMPERATURE: 98.2 F | WEIGHT: 135 LBS | HEIGHT: 72 IN

## 2021-10-29 VITALS — SYSTOLIC BLOOD PRESSURE: 118 MMHG | DIASTOLIC BLOOD PRESSURE: 68 MMHG

## 2021-10-29 DIAGNOSIS — R06.00 DYSPNEA, UNSPECIFIED: ICD-10-CM

## 2021-10-29 PROCEDURE — 93000 ELECTROCARDIOGRAM COMPLETE: CPT

## 2021-10-29 PROCEDURE — 99204 OFFICE O/P NEW MOD 45 MIN: CPT

## 2021-10-29 RX ORDER — LIDOCAINE 50 MG/G
5 CREAM TOPICAL
Qty: 1 | Refills: 2 | Status: DISCONTINUED | COMMUNITY
Start: 2018-06-25 | End: 2021-10-29

## 2021-10-29 RX ORDER — CLOBETASOL PROPIONATE 0.5 MG/G
0.05 CREAM TOPICAL 3 TIMES DAILY
Qty: 60 | Refills: 0 | Status: DISCONTINUED | COMMUNITY
Start: 2018-07-26 | End: 2021-10-29

## 2021-10-29 RX ORDER — DIAPER RASH SKIN PROTECTENT 40 G/100G
40 OINTMENT TOPICAL
Qty: 1 | Refills: 0 | Status: DISCONTINUED | COMMUNITY
Start: 2018-06-25 | End: 2021-10-29

## 2021-10-29 RX ORDER — POLYETHYLENE GLYCOL 3350 17 G/17G
17 POWDER, FOR SOLUTION ORAL
Qty: 30 | Refills: 0 | Status: DISCONTINUED | COMMUNITY
Start: 2018-06-25 | End: 2021-10-29

## 2021-10-29 RX ORDER — IPRATROPIUM BROMIDE 42 UG/1
0.06 SPRAY NASAL
Qty: 15 | Refills: 0 | Status: DISCONTINUED | COMMUNITY
Start: 2016-10-26 | End: 2021-10-29

## 2021-10-29 RX ORDER — SULFAMETHOXAZOLE AND TRIMETHOPRIM 800; 160 MG/1; MG/1
800-160 TABLET ORAL
Qty: 14 | Refills: 0 | Status: DISCONTINUED | COMMUNITY
Start: 2021-08-02 | End: 2021-10-29

## 2021-10-29 RX ORDER — UMECLIDINIUM 62.5 UG/1
62.5 AEROSOL, POWDER ORAL DAILY
Qty: 1 | Refills: 2 | Status: DISCONTINUED | COMMUNITY
Start: 2018-07-09 | End: 2021-10-29

## 2021-10-29 NOTE — REVIEW OF SYSTEMS
[Feeling Fatigued] : feeling fatigued [Weight Loss (___ Lbs)] : [unfilled] ~Ulb weight loss [SOB] : shortness of breath [Dyspnea on exertion] : dyspnea during exertion [Orthopnea] : orthopnea [Cough] : cough [Weakness] : weakness [Negative] : Integumentary [Fever] : no fever [Chills] : no chills [Chest Discomfort] : no chest discomfort [Lower Ext Edema] : no extremity edema [Palpitations] : no palpitations [Syncope] : no syncope [PND] : no PND [Dizziness] : no dizziness [Tremor] : no tremor was seen
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
## 2021-10-29 NOTE — PHYSICAL EXAM
[No Acute Distress] : no acute distress [Frail] : frail [Normal] : no edema, no cyanosis, no clubbing, no varicosities

## 2021-11-17 LAB
APPEARANCE: ABNORMAL
BACTERIA: ABNORMAL
BILIRUBIN URINE: NEGATIVE
BLOOD URINE: ABNORMAL
COLOR: YELLOW
GLUCOSE QUALITATIVE U: NEGATIVE
HYALINE CASTS: 0 /LPF
KETONES URINE: NEGATIVE
LEUKOCYTE ESTERASE URINE: ABNORMAL
MICROSCOPIC-UA: NORMAL
NITRITE URINE: NEGATIVE
PH URINE: 7
PROTEIN URINE: ABNORMAL
RED BLOOD CELLS URINE: 3 /HPF
SPECIFIC GRAVITY URINE: 1.01
SQUAMOUS EPITHELIAL CELLS: 5 /HPF
UROBILINOGEN URINE: NORMAL
WHITE BLOOD CELLS URINE: >720 /HPF

## 2021-11-19 ENCOUNTER — NON-APPOINTMENT (OUTPATIENT)
Age: 81
End: 2021-11-19

## 2021-11-22 ENCOUNTER — INPATIENT (INPATIENT)
Facility: HOSPITAL | Age: 81
LOS: 1 days | Discharge: HOME | DRG: 689 | End: 2021-11-24
Attending: INTERNAL MEDICINE | Admitting: INTERNAL MEDICINE
Payer: MEDICARE

## 2021-11-22 VITALS
SYSTOLIC BLOOD PRESSURE: 117 MMHG | RESPIRATION RATE: 17 BRPM | DIASTOLIC BLOOD PRESSURE: 78 MMHG | HEIGHT: 72.01 IN | TEMPERATURE: 99 F | HEART RATE: 87 BPM | OXYGEN SATURATION: 98 %

## 2021-11-22 DIAGNOSIS — Z95.828 PRESENCE OF OTHER VASCULAR IMPLANTS AND GRAFTS: Chronic | ICD-10-CM

## 2021-11-22 DIAGNOSIS — N39.0 URINARY TRACT INFECTION, SITE NOT SPECIFIED: ICD-10-CM

## 2021-11-22 DIAGNOSIS — R09.89 OTHER SPECIFIED SYMPTOMS AND SIGNS INVOLVING THE CIRCULATORY AND RESPIRATORY SYSTEMS: ICD-10-CM

## 2021-11-22 DIAGNOSIS — I48.91 UNSPECIFIED ATRIAL FIBRILLATION: ICD-10-CM

## 2021-11-22 DIAGNOSIS — Z98.89 OTHER SPECIFIED POSTPROCEDURAL STATES: Chronic | ICD-10-CM

## 2021-11-22 DIAGNOSIS — Z29.9 ENCOUNTER FOR PROPHYLACTIC MEASURES, UNSPECIFIED: ICD-10-CM

## 2021-11-22 DIAGNOSIS — K59.00 CONSTIPATION, UNSPECIFIED: ICD-10-CM

## 2021-11-22 LAB
ALBUMIN SERPL ELPH-MCNC: 4 G/DL — SIGNIFICANT CHANGE UP (ref 3.3–5)
ALP SERPL-CCNC: 67 U/L — SIGNIFICANT CHANGE UP (ref 40–120)
ALT FLD-CCNC: 13 U/L — SIGNIFICANT CHANGE UP (ref 10–45)
ANION GAP SERPL CALC-SCNC: 14 MMOL/L — SIGNIFICANT CHANGE UP (ref 5–17)
APPEARANCE UR: ABNORMAL
APTT BLD: 43.4 SEC — HIGH (ref 27.5–35.5)
AST SERPL-CCNC: 21 U/L — SIGNIFICANT CHANGE UP (ref 10–40)
BACTERIA # UR AUTO: PRESENT /HPF
BACTERIA UR CULT: ABNORMAL
BASOPHILS # BLD AUTO: 0.04 K/UL — SIGNIFICANT CHANGE UP (ref 0–0.2)
BASOPHILS NFR BLD AUTO: 0.9 % — SIGNIFICANT CHANGE UP (ref 0–2)
BILIRUB SERPL-MCNC: 1.1 MG/DL — SIGNIFICANT CHANGE UP (ref 0.2–1.2)
BILIRUB UR-MCNC: NEGATIVE — SIGNIFICANT CHANGE UP
BUN SERPL-MCNC: 29 MG/DL — HIGH (ref 7–23)
CALCIUM SERPL-MCNC: 10.4 MG/DL — SIGNIFICANT CHANGE UP (ref 8.4–10.5)
CHLORIDE SERPL-SCNC: 107 MMOL/L — SIGNIFICANT CHANGE UP (ref 96–108)
CO2 SERPL-SCNC: 23 MMOL/L — SIGNIFICANT CHANGE UP (ref 22–31)
COLOR SPEC: YELLOW — SIGNIFICANT CHANGE UP
CREAT SERPL-MCNC: 0.62 MG/DL — SIGNIFICANT CHANGE UP (ref 0.5–1.3)
DIFF PNL FLD: ABNORMAL
EOSINOPHIL # BLD AUTO: 0.13 K/UL — SIGNIFICANT CHANGE UP (ref 0–0.5)
EOSINOPHIL NFR BLD AUTO: 3.1 % — SIGNIFICANT CHANGE UP (ref 0–6)
EPI CELLS # UR: SIGNIFICANT CHANGE UP /HPF (ref 0–5)
GLUCOSE SERPL-MCNC: 90 MG/DL — SIGNIFICANT CHANGE UP (ref 70–99)
GLUCOSE UR QL: NEGATIVE — SIGNIFICANT CHANGE UP
HCT VFR BLD CALC: 42.8 % — SIGNIFICANT CHANGE UP (ref 39–50)
HGB BLD-MCNC: 14.6 G/DL — SIGNIFICANT CHANGE UP (ref 13–17)
IMM GRANULOCYTES NFR BLD AUTO: 0.2 % — SIGNIFICANT CHANGE UP (ref 0–1.5)
INR BLD: 1.73 — HIGH (ref 0.88–1.16)
KETONES UR-MCNC: 15 MG/DL
LACTATE SERPL-SCNC: 1.2 MMOL/L — SIGNIFICANT CHANGE UP (ref 0.5–2)
LEUKOCYTE ESTERASE UR-ACNC: ABNORMAL
LYMPHOCYTES # BLD AUTO: 1.1 K/UL — SIGNIFICANT CHANGE UP (ref 1–3.3)
LYMPHOCYTES # BLD AUTO: 25.9 % — SIGNIFICANT CHANGE UP (ref 13–44)
MCHC RBC-ENTMCNC: 32.8 PG — SIGNIFICANT CHANGE UP (ref 27–34)
MCHC RBC-ENTMCNC: 34.1 GM/DL — SIGNIFICANT CHANGE UP (ref 32–36)
MCV RBC AUTO: 96.2 FL — SIGNIFICANT CHANGE UP (ref 80–100)
MONOCYTES # BLD AUTO: 0.47 K/UL — SIGNIFICANT CHANGE UP (ref 0–0.9)
MONOCYTES NFR BLD AUTO: 11.1 % — SIGNIFICANT CHANGE UP (ref 2–14)
NEUTROPHILS # BLD AUTO: 2.49 K/UL — SIGNIFICANT CHANGE UP (ref 1.8–7.4)
NEUTROPHILS NFR BLD AUTO: 58.8 % — SIGNIFICANT CHANGE UP (ref 43–77)
NITRITE UR-MCNC: NEGATIVE — SIGNIFICANT CHANGE UP
NRBC # BLD: 0 /100 WBCS — SIGNIFICANT CHANGE UP (ref 0–0)
PH UR: 7.5 — SIGNIFICANT CHANGE UP (ref 5–8)
PLATELET # BLD AUTO: 168 K/UL — SIGNIFICANT CHANGE UP (ref 150–400)
POTASSIUM SERPL-MCNC: 3.5 MMOL/L — SIGNIFICANT CHANGE UP (ref 3.5–5.3)
POTASSIUM SERPL-SCNC: 3.5 MMOL/L — SIGNIFICANT CHANGE UP (ref 3.5–5.3)
PROT SERPL-MCNC: 7.1 G/DL — SIGNIFICANT CHANGE UP (ref 6–8.3)
PROT UR-MCNC: >=300 MG/DL
PROTHROM AB SERPL-ACNC: 20.2 SEC — HIGH (ref 10.6–13.6)
RBC # BLD: 4.45 M/UL — SIGNIFICANT CHANGE UP (ref 4.2–5.8)
RBC # FLD: 13.6 % — SIGNIFICANT CHANGE UP (ref 10.3–14.5)
RBC CASTS # UR COMP ASSIST: < 5 /HPF — SIGNIFICANT CHANGE UP
SARS-COV-2 RNA SPEC QL NAA+PROBE: NEGATIVE — SIGNIFICANT CHANGE UP
SODIUM SERPL-SCNC: 144 MMOL/L — SIGNIFICANT CHANGE UP (ref 135–145)
SP GR SPEC: 1.02 — SIGNIFICANT CHANGE UP (ref 1–1.03)
UROBILINOGEN FLD QL: 0.2 E.U./DL — SIGNIFICANT CHANGE UP
WBC # BLD: 4.24 K/UL — SIGNIFICANT CHANGE UP (ref 3.8–10.5)
WBC # FLD AUTO: 4.24 K/UL — SIGNIFICANT CHANGE UP (ref 3.8–10.5)
WBC UR QL: ABNORMAL /HPF

## 2021-11-22 PROCEDURE — 99222 1ST HOSP IP/OBS MODERATE 55: CPT

## 2021-11-22 PROCEDURE — 99285 EMERGENCY DEPT VISIT HI MDM: CPT

## 2021-11-22 RX ORDER — INFLUENZA VIRUS VACCINE 15; 15; 15; 15 UG/.5ML; UG/.5ML; UG/.5ML; UG/.5ML
0.7 SUSPENSION INTRAMUSCULAR ONCE
Refills: 0 | Status: COMPLETED | OUTPATIENT
Start: 2021-11-22 | End: 2021-11-24

## 2021-11-22 RX ORDER — MEROPENEM 1 G/30ML
1000 INJECTION INTRAVENOUS EVERY 8 HOURS
Refills: 0 | Status: DISCONTINUED | OUTPATIENT
Start: 2021-11-22 | End: 2021-11-22

## 2021-11-22 RX ORDER — MEROPENEM 1 G/30ML
1000 INJECTION INTRAVENOUS EVERY 8 HOURS
Refills: 0 | Status: COMPLETED | OUTPATIENT
Start: 2021-11-22 | End: 2021-11-22

## 2021-11-22 RX ORDER — ERTAPENEM SODIUM 1 G/1
1000 INJECTION, POWDER, LYOPHILIZED, FOR SOLUTION INTRAMUSCULAR; INTRAVENOUS EVERY 24 HOURS
Refills: 0 | Status: DISCONTINUED | OUTPATIENT
Start: 2021-11-22 | End: 2021-11-24

## 2021-11-22 RX ORDER — SENNA PLUS 8.6 MG/1
1 TABLET ORAL DAILY
Refills: 0 | Status: DISCONTINUED | OUTPATIENT
Start: 2021-11-22 | End: 2021-11-22

## 2021-11-22 RX ORDER — APIXABAN 2.5 MG/1
5 TABLET, FILM COATED ORAL EVERY 12 HOURS
Refills: 0 | Status: DISCONTINUED | OUTPATIENT
Start: 2021-11-22 | End: 2021-11-24

## 2021-11-22 RX ORDER — MEROPENEM 1 G/30ML
1000 INJECTION INTRAVENOUS ONCE
Refills: 0 | Status: COMPLETED | OUTPATIENT
Start: 2021-11-22 | End: 2021-11-22

## 2021-11-22 RX ORDER — POLYETHYLENE GLYCOL 3350 17 G/17G
17 POWDER, FOR SOLUTION ORAL DAILY
Refills: 0 | Status: DISCONTINUED | OUTPATIENT
Start: 2021-11-22 | End: 2021-11-22

## 2021-11-22 RX ADMIN — ERTAPENEM SODIUM 120 MILLIGRAM(S): 1 INJECTION, POWDER, LYOPHILIZED, FOR SOLUTION INTRAMUSCULAR; INTRAVENOUS at 19:26

## 2021-11-22 RX ADMIN — MEROPENEM 100 MILLIGRAM(S): 1 INJECTION INTRAVENOUS at 11:54

## 2021-11-22 RX ADMIN — MEROPENEM 100 MILLIGRAM(S): 1 INJECTION INTRAVENOUS at 03:10

## 2021-11-22 RX ADMIN — APIXABAN 5 MILLIGRAM(S): 2.5 TABLET, FILM COATED ORAL at 06:36

## 2021-11-22 RX ADMIN — APIXABAN 5 MILLIGRAM(S): 2.5 TABLET, FILM COATED ORAL at 19:26
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
## 2021-11-22 NOTE — H&P ADULT - NSHPLABSRESULTS_GEN_ALL_CORE
LABS:                        14.6   4.24  )-----------( 168      ( 2021 01:40 )             42.8     11-    144  |  107  |  29<H>  ----------------------------<  90  3.5   |  23  |  0.62    Ca    10.4      2021 01:40    TPro  7.1  /  Alb  4.0  /  TBili  1.1  /  DBili  x   /  AST  21  /  ALT  13  /  AlkPhos  67  11-    PT/INR - ( 2021 01:40 )   PT: 20.2 sec;   INR: 1.73          PTT - ( 2021 01:40 )  PTT:43.4 sec  Urinalysis Basic - ( 2021 00:45 )    Color: Yellow / Appearance: Cloudy / S.025 / pH: x  Gluc: x / Ketone: 15 mg/dL  / Bili: Negative / Urobili: 0.2 E.U./dL   Blood: x / Protein: >=300 mg/dL / Nitrite: NEGATIVE   Leuk Esterase: Large / RBC: < 5 /HPF / WBC Many /HPF   Sq Epi: x / Non Sq Epi: 0-5 /HPF / Bacteria: Present /HPF      LIVER FUNCTIONS - ( 2021 01:40 )  Alb: 4.0 g/dL / Pro: 7.1 g/dL / ALK PHOS: 67 U/L / ALT: 13 U/L / AST: 21 U/L / GGT: x

## 2021-11-22 NOTE — ED ADULT TRIAGE NOTE - ARRIVAL INFO ADDITIONAL COMMENTS
EMS report that patient was sent at the recommendation of his provider following a diagnosed urinary tract infection. Patient reports that his urologist sent him for additional work-up. Patient awake and responsive. Reports that he needs to be treated with IV antibiotics but he has many allergies to specific antibiotic therapy.

## 2021-11-22 NOTE — CHART NOTE - NSCHARTNOTEFT_GEN_A_CORE
Infectious Diseases Anti-infective Approval Note    Medication:  Ertapenem  Dose:  1 g   Route:  IV  Frequency:  q24h  Duration**:  5d    Dose may be adjusted as needed for alterations in renal function.    *THIS IS NOT AN INFECTIOUS DISEASES CONSULTATION*    **Indicates duration of approval, not necessarily duration of treatment
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
## 2021-11-22 NOTE — H&P ADULT - NSTOBACCOSCREENHP_GEN_A_NCS
Quality 110: Preventive Care And Screening: Influenza Immunization: Influenza Immunization not Administered for Documented Reasons. Additional Notes: Pt declines flu vaccine for personal reasons Detail Level: Detailed No
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
## 2021-11-22 NOTE — PHYSICAL THERAPY INITIAL EVALUATION ADULT - ADDITIONAL COMMENTS
pt lives with wife in elevator access apartment, with no AMIRAH. functions independently without an AD w/in the home, however has grab bars and furniture for support. uses "wheelchair" handles to ambulate in the community, and reports using the seat to carry groceries.
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
## 2021-11-22 NOTE — DIETITIAN INITIAL EVALUATION ADULT. - BUCCAL DEPLETION IS
Patient came in for right shoulder pain since this morning. He said that it started when he was trying to put his back support and overextended his arm while doing it.   
moderate
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
## 2021-11-22 NOTE — STUDENT SIGN OFF DOCUMENT - STUDENT DOCUMENT REVIEW
Jean Pierre ELLIS'Brien How Severe Are They?: mild Is This A New Presentation, Or A Follow-Up?: Scar

## 2021-11-22 NOTE — H&P ADULT - PROBLEM SELECTOR PLAN 3
F: No IVF at this time  E: Replete electrolytes as needed, K>4, M>2  N: dysphagia screen/S&S  DVT ppx: on Eliquis  GI ppx: na  Dispo: [facility]

## 2021-11-22 NOTE — PROGRESS NOTE ADULT - PROBLEM SELECTOR PLAN 2
- c/w Eliquis 5 mg BID PLAN  - c/w Eliquis 5 mg BID Pt complains for 3 days without any bowel movement. Requires laxatives.  PLAN:  - Encourage water intake.   - Started with Senna PO QD and Miralax PO QD.
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
## 2021-11-22 NOTE — PROGRESS NOTE ADULT - SUBJECTIVE AND OBJECTIVE BOX
OVERNIGHT EVENTS: NAEO    SUBJECTIVE / INTERVAL HPI: Patient seen and examined at bedside. Patient denying chest pain, SOB, palpitations, cough. Patient denies fever, chills, HA, Dizziness, change in vision/hearing, N/V, abdominal pain, diarrhea, constipation, hematochezia/melena, dysuria, hematuria, new onset weakness/numbness, LE pain and/or swelling.    Remaining ROS negative     PHYSICAL EXAM:  General: NAD, lying in bed comfortably  HEENT: NC/AT; PERRL, anicteric sclera; MMM  Neck: supple  Cardiovascular: +S1/S2, RRR  Respiratory: CTA B/L; no W/R/R  Gastrointestinal: soft, NT/ND; +BSx4  Extremities: WWP; no edema, clubbing or cyanosis  Vascular: 2+ radial, DP/PT pulses B/L  Neurological: AAOx3; no focal deficits  Psychiatric: pleasant mood and affect  Dermatologic: no appreciable wounds or damage to the skin    VITAL SIGNS:  Vital Signs Last 24 Hrs  T(C): 36.7 (2021 09:23), Max: 37.1 (2021 00:25)  T(F): 98 (2021 09:23), Max: 98.7 (2021 00:25)  HR: 62 (2021 09:23) (62 - 87)  BP: 123/69 (2021 09:23) (117/78 - 123/69)  RR: 18 (2021 09:23) (17 - 18)  SpO2: 95% (2021 09:23) (95% - 98%)    MEDICATIONS:  MEDICATIONS  (STANDING):  apixaban 5 milliGRAM(s) Oral every 12 hours  influenza  Vaccine (HIGH DOSE) 0.7 milliLiter(s) IntraMuscular once  meropenem  IVPB 1000 milliGRAM(s) IV Intermittent every 8 hours    ALLERGIES:  cefepime (Unknown)  Cipro (Short breath)  penicillins (Anaphylaxis)  Zithromax (Short breath)    LABS:                        14.6   4.24  )-----------( 168      ( 2021 01:40 )             42.8     11-    144  |  107  |  29<H>  ----------------------------<  90  3.5   |  23  |  0.62    Ca    10.4      2021 01:40    TPro  7.1  /  Alb  4.0  /  TBili  1.1  /  DBili  x   /  AST  21  /  ALT  13  /  AlkPhos  67  11-22    PT/INR - ( 2021 01:40 )   PT: 20.2 sec;   INR: 1.73       PTT - ( 2021 01:40 )  PTT:43.4 sec  Urinalysis Basic - ( 2021 00:45 )    Color: Yellow / Appearance: Cloudy / S.025 / pH: x  Gluc: x / Ketone: 15 mg/dL  / Bili: Negative / Urobili: 0.2 E.U./dL   Blood: x / Protein: >=300 mg/dL / Nitrite: NEGATIVE   Leuk Esterase: Large / RBC: < 5 /HPF / WBC Many /HPF   Sq Epi: x / Non Sq Epi: 0-5 /HPF / Bacteria: Present /HPF    RADIOLOGY & ADDITIONAL TESTS: Reviewed. OVERNIGHT EVENTS: NAEO    SUBJECTIVE / INTERVAL HPI: Patient seen and examined at bedside. Patient feeling ok today morning. Patient denying chest pain, SOB, palpitations, cough. Patient denies fever, chills, HA, Dizziness, change in vision/hearing, N/V, abdominal pain, diarrhea, constipation, hematochezia/melena, hematuria, new onset weakness/numbness, LE pain and/or swelling.    Remaining ROS negative     PHYSICAL EXAM:  General: NAD, elderly man, appears frail, very thin  HEENT: NC/AT; PERRL, clear conjunctiva  Neck: supple  Respiratory: CTA b/l  Cardiovascular: +S1/S2; RRR  Abdomen: soft, NT/ND; +BS x4  Extremities: 2+ peripheral pulses b/l; no LE edema  Skin: normal color and turgor; no rash  Neurological: AAOx3   Psychiatry: mood and affect appropriate    VITAL SIGNS:  Vital Signs Last 24 Hrs  T(C): 36.7 (2021 09:23), Max: 37.1 (2021 00:25)  T(F): 98 (2021 09:23), Max: 98.7 (2021 00:25)  HR: 62 (2021 09:23) (62 - 87)  BP: 123/69 (2021 09:23) (117/78 - 123/69)  RR: 18 (2021 09:23) (17 - 18)  SpO2: 95% (2021 09:23) (95% - 98%)    MEDICATIONS:  MEDICATIONS  (STANDING):  apixaban 5 milliGRAM(s) Oral every 12 hours  influenza  Vaccine (HIGH DOSE) 0.7 milliLiter(s) IntraMuscular once  meropenem  IVPB 1000 milliGRAM(s) IV Intermittent every 8 hours    ALLERGIES:  cefepime (Unknown)  Cipro (Short breath)  penicillins (Anaphylaxis)  Zithromax (Short breath)    LABS:                        14.6   4.24  )-----------( 168      ( 2021 01:40 )             42.8         144  |  107  |  29<H>  ----------------------------<  90  3.5   |  23  |  0.62    Ca    10.4      2021 01:40    TPro  7.1  /  Alb  4.0  /  TBili  1.1  /  DBili  x   /  AST  21  /  ALT  13  /  AlkPhos  67  11-22    PT/INR - ( 2021 01:40 )   PT: 20.2 sec;   INR: 1.73       PTT - ( 2021 01:40 )  PTT:43.4 sec  Urinalysis Basic - ( 2021 00:45 )    Color: Yellow / Appearance: Cloudy / S.025 / pH: x  Gluc: x / Ketone: 15 mg/dL  / Bili: Negative / Urobili: 0.2 E.U./dL   Blood: x / Protein: >=300 mg/dL / Nitrite: NEGATIVE   Leuk Esterase: Large / RBC: < 5 /HPF / WBC Many /HPF   Sq Epi: x / Non Sq Epi: 0-5 /HPF / Bacteria: Present /HPF    RADIOLOGY & ADDITIONAL TESTS: Reviewed. OVERNIGHT EVENTS: NAEO    SUBJECTIVE / INTERVAL HPI: Patient seen and examined at bedside. Patient feeling ok today morning. Constipated for 3 days. Still coughing with sputum. When urinating, dark tinged blood urine. Patient denying chest pain, SOB, palpitations, cough. Patient denies fever, chills, HA, Dizziness, change in vision/hearing, N/V, abdominal pain, diarrhea, constipation, hematochezia/melena, hematuria, new onset weakness/numbness, LE pain and/or swelling.    Remaining ROS negative     PHYSICAL EXAM:  General: NAD, elderly man, appears frail, very thin  HEENT: NC/AT; PERRL, clear conjunctiva  Neck: supple  Respiratory: CTA b/l  Cardiovascular: +S1/S2; RRR  Abdomen: soft, NT/ND; +BS x4  Extremities: 2+ peripheral pulses b/l; no LE edema  Skin: normal color and turgor; no rash  Neurological: AAOx3   Psychiatry: mood and affect appropriate    VITAL SIGNS:  Vital Signs Last 24 Hrs  T(C): 36.7 (2021 09:23), Max: 37.1 (2021 00:25)  T(F): 98 (2021 09:23), Max: 98.7 (2021 00:25)  HR: 62 (2021 09:23) (62 - 87)  BP: 123/69 (2021 09:23) (117/78 - 123/69)  RR: 18 (2021 09:23) (17 - 18)  SpO2: 95% (2021 09:23) (95% - 98%)    MEDICATIONS:  MEDICATIONS  (STANDING):  apixaban 5 milliGRAM(s) Oral every 12 hours  influenza  Vaccine (HIGH DOSE) 0.7 milliLiter(s) IntraMuscular once  meropenem  IVPB 1000 milliGRAM(s) IV Intermittent every 8 hours    ALLERGIES:  cefepime (Unknown)  Cipro (Short breath)  penicillins (Anaphylaxis)  Zithromax (Short breath)    LABS:                        14.6   4.24  )-----------( 168      ( 2021 01:40 )             42.8     11-    144  |  107  |  29<H>  ----------------------------<  90  3.5   |  23  |  0.62    Ca    10.4      2021 01:40    TPro  7.1  /  Alb  4.0  /  TBili  1.1  /  DBili  x   /  AST  21  /  ALT  13  /  AlkPhos  67  11-22    PT/INR - ( 2021 01:40 )   PT: 20.2 sec;   INR: 1.73       PTT - ( 2021 01:40 )  PTT:43.4 sec  Urinalysis Basic - ( 2021 00:45 )    Color: Yellow / Appearance: Cloudy / S.025 / pH: x  Gluc: x / Ketone: 15 mg/dL  / Bili: Negative / Urobili: 0.2 E.U./dL   Blood: x / Protein: >=300 mg/dL / Nitrite: NEGATIVE   Leuk Esterase: Large / RBC: < 5 /HPF / WBC Many /HPF   Sq Epi: x / Non Sq Epi: 0-5 /HPF / Bacteria: Present /HPF    RADIOLOGY & ADDITIONAL TESTS: Reviewed. OVERNIGHT EVENTS: NAEO    SUBJECTIVE / INTERVAL HPI: Patient seen and examined at bedside. Patient denying chest pain, SOB, palpitations, cough. Patient denies fever, chills, HA, Dizziness, change in vision/hearing, N/V, abdominal pain, diarrhea, constipation, hematochezia/melena, hematuria, new onset weakness/numbness, LE pain and/or swelling.    Remaining ROS negative     PHYSICAL EXAM:  General: NAD, elderly man, appears frail, very thin  HEENT: NC/AT; PERRL, clear conjunctiva  Neck: supple  Respiratory: CTA b/l  Cardiovascular: +S1/S2; RRR  Abdomen: soft, NT/ND; +BS x4  Extremities: 2+ peripheral pulses b/l; no LE edema  Skin: normal color and turgor; no rash  Neurological: AAOx3   Psychiatry: mood and affect appropriate    VITAL SIGNS:  Vital Signs Last 24 Hrs  T(C): 36.7 (2021 09:23), Max: 37.1 (2021 00:25)  T(F): 98 (2021 09:23), Max: 98.7 (2021 00:25)  HR: 62 (2021 09:23) (62 - 87)  BP: 123/69 (2021 09:23) (117/78 - 123/69)  RR: 18 (2021 09:23) (17 - 18)  SpO2: 95% (2021 09:23) (95% - 98%)    MEDICATIONS:  MEDICATIONS  (STANDING):  apixaban 5 milliGRAM(s) Oral every 12 hours  influenza  Vaccine (HIGH DOSE) 0.7 milliLiter(s) IntraMuscular once  meropenem  IVPB 1000 milliGRAM(s) IV Intermittent every 8 hours    ALLERGIES:  cefepime (Unknown)  Cipro (Short breath)  penicillins (Anaphylaxis)  Zithromax (Short breath)    LABS:                        14.6   4.24  )-----------( 168      ( 2021 01:40 )             42.8         144  |  107  |  29<H>  ----------------------------<  90  3.5   |  23  |  0.62    Ca    10.4      2021 01:40    TPro  7.1  /  Alb  4.0  /  TBili  1.1  /  DBili  x   /  AST  21  /  ALT  13  /  AlkPhos  67  11-22    PT/INR - ( 2021 01:40 )   PT: 20.2 sec;   INR: 1.73       PTT - ( 2021 01:40 )  PTT:43.4 sec  Urinalysis Basic - ( 2021 00:45 )    Color: Yellow / Appearance: Cloudy / S.025 / pH: x  Gluc: x / Ketone: 15 mg/dL  / Bili: Negative / Urobili: 0.2 E.U./dL   Blood: x / Protein: >=300 mg/dL / Nitrite: NEGATIVE   Leuk Esterase: Large / RBC: < 5 /HPF / WBC Many /HPF   Sq Epi: x / Non Sq Epi: 0-5 /HPF / Bacteria: Present /HPF    RADIOLOGY & ADDITIONAL TESTS: Reviewed. OVERNIGHT EVENTS: NAEO    SUBJECTIVE / INTERVAL HPI: Patient seen and examined at bedside. Patient improved significantly clinically. Patient denying chest pain, SOB, palpitations, cough. Patient denies fever, chills, HA, Dizziness, change in vision/hearing, N/V, abdominal pain, diarrhea, constipation, hematochezia/melena, new onset weakness/numbness, LE pain and/or swelling.    Remaining ROS negative     PHYSICAL EXAM:  GENERAL: NAD, elderly man, appears frail, very thin  EYES: EOMI, PERRLA, conjunctiva and sclera clear  ENMT: No tonsillar erythema, exudates, or enlargement  NECK: Supple  CHEST/LUNG: Clear to percussion bilaterally  HEART: Regular rate and rhythm; No murmurs, rubs, or gallops  ABDOMEN: Soft, Nontender, Nondistended; Bowel sounds present  GROIN: No erythema noted, surgical absence of L testicle, R hernia present  EXTREMITIES:  2+ Peripheral Pulses, wearing compression stockings, no swelling    VITAL SIGNS:  Vital Signs Last 24 Hrs  T(C): 36.7 (2021 09:23), Max: 37.1 (2021 00:25)  T(F): 98 (2021 09:23), Max: 98.7 (2021 00:25)  HR: 62 (2021 09:23) (62 - 87)  BP: 123/69 (2021 09:23) (117/78 - 123/69)  RR: 18 (2021 09:23) (17 - 18)  SpO2: 95% (2021 09:23) (95% - 98%)    MEDICATIONS:  MEDICATIONS  (STANDING):  apixaban 5 milliGRAM(s) Oral every 12 hours  influenza  Vaccine (HIGH DOSE) 0.7 milliLiter(s) IntraMuscular once  meropenem  IVPB 1000 milliGRAM(s) IV Intermittent every 8 hours    ALLERGIES:  cefepime (Unknown)  Cipro (Short breath)  penicillins (Anaphylaxis)  Zithromax (Short breath)    LABS:                        14.6   4.24  )-----------( 168      ( 2021 01:40 )             42.8     11-    144  |  107  |  29<H>  ----------------------------<  90  3.5   |  23  |  0.62    Ca    10.4      2021 01:40    TPro  7.1  /  Alb  4.0  /  TBili  1.1  /  DBili  x   /  AST  21  /  ALT  13  /  AlkPhos  67  11-22    PT/INR - ( 2021 01:40 )   PT: 20.2 sec;   INR: 1.73       PTT - ( 2021 01:40 )  PTT:43.4 sec  Urinalysis Basic - ( 2021 00:45 )    Color: Yellow / Appearance: Cloudy / S.025 / pH: x  Gluc: x / Ketone: 15 mg/dL  / Bili: Negative / Urobili: 0.2 E.U./dL   Blood: x / Protein: >=300 mg/dL / Nitrite: NEGATIVE   Leuk Esterase: Large / RBC: < 5 /HPF / WBC Many /HPF   Sq Epi: x / Non Sq Epi: 0-5 /HPF / Bacteria: Present /HPF    RADIOLOGY & ADDITIONAL TESTS: Reviewed.

## 2021-11-22 NOTE — ED ADULT NURSE NOTE - NSIMPLEMENTINTERV_GEN_ALL_ED
Implemented All Universal Safety Interventions:  [place] to call system. Call bell, personal items and telephone within reach. Instruct patient to call for assistance. Room bathroom lighting operational. Non-slip footwear when patient is off stretcher. Physically safe environment: no spills, clutter or unnecessary equipment. Stretcher in lowest position, wheels locked, appropriate side rails in place.

## 2021-11-22 NOTE — PROGRESS NOTE ADULT - PROBLEM SELECTOR PLAN 1
Patient with a couple of weeks of worsening symptoms of dysuria and cloudy urine. Urine culture with proteus UTI.   Has significant allergies to multiple antibiotics, including anaphylactic reactions. Allergies include: Amoxicilllin, Cefepime, Azithromycin, Cipro.  - s/p 1g meropenem in ED  - get  ID approval to continue meropenem  in the AM Likely 2/2 Proteus Mirabilis. Patient with a couple of weeks of worsening symptoms of dysuria and cloudy urine. Urine culture with proteus UTI.   Has significant allergies to multiple antibiotics, including anaphylactic reactions. Allergies include: Amoxicilllin, Cefepime, Azithromycin, Cipro. Started with 1g meropenem in ED QD and continued on floors.  PLAN:  - Continue with meropenem 1g QD.  - Follow up with ID recommendations given extensive allergies  - Likely will send with PICC line for infusion center Complicated UTI Likely 2/2 Proteus Mirabilis. Patient with a couple of weeks of worsening symptoms of dysuria and cloudy urine. Urine culture with proteus UTI.   Has significant allergies to multiple antibiotics, including anaphylactic reactions. Allergies include: Amoxicilllin, Cefepime, Azithromycin, Cipro. Started with 1g meropenem in ED QD and continued on floors. Started with Meropenem 1g QD, and continued with Ertapenem for 5 more days (through 11/28) by ID recommendation.  PLAN:  - Started with Ertapenem for 5 more days (through 11/28)  - Likely will send with PICC line for infusion center
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
## 2021-11-22 NOTE — PROGRESS NOTE ADULT - PROBLEM SELECTOR PLAN 1
Patient with a couple of weeks of worsening symptoms of dysuria and cloudy urine. No blood in urine. Urine culture with proteus UTI. Has significant allergies to multiple antibiotics, including anaphylactic reactions. Allergies include: Amoxicilllin, Cefepime, Azithromycin, Cipro.  - s/p 1g meropenem in ED  - consult ID for antibiotic recs given extensive allergies  - f/u urine cultures Patient with a couple of weeks of worsening symptoms of dysuria and cloudy urine. No blood in urine. Urine culture with proteus UTI. Has significant allergies to multiple antibiotics, including anaphylactic reactions. Allergies include: Amoxicilllin, Cefepime, Azithromycin, Cipro.  - s/p 1g meropenem in ED  - consult ID for antibiotic recs given extensive allergies  - f/u urine cultures  - likely will send with PICC line for infusion center

## 2021-11-22 NOTE — ED PROVIDER NOTE - CLINICAL SUMMARY MEDICAL DECISION MAKING FREE TEXT BOX
here with dysuria, urinary incontinence, increased urinary frequency and cloudy urinee, w/ urine cultures +proteus mirabilis with drug resistance and multiple antibiotic allergies, will need admission for IV antibiotic.

## 2021-11-22 NOTE — PROGRESS NOTE ADULT - PROBLEM SELECTOR PLAN 3
F: No IVF at this time  E: Replete electrolytes as needed, K>4, M>2  N: dysphagia screen/S&S  DVT ppx: on Eliquis  GI ppx: na  Dispo: [facility]

## 2021-11-22 NOTE — ED ADULT NURSE NOTE - OBJECTIVE STATEMENT
received A&Ox3 81years old male pt with c/o of " I came to the ED because I was told I have UTI." currently, pt denies fever, chest pain, sob, N/V/D. pt denies burning sensation or blood in the urine. pt states there is foul odor and color appears cloudy. pt is able to ambulate to the toilet independently.

## 2021-11-22 NOTE — ED PROVIDER NOTE - CARE PLAN
Principal Discharge DX:	Acute UTI  Secondary Diagnosis:	Resistance to multiple antimicrobial drugs   1

## 2021-11-22 NOTE — DIETITIAN INITIAL EVALUATION ADULT. - PROBLEM SELECTOR PLAN 3
F: No IVF at this time  E: Replete electrolytes as needed, K>4, M>2  N: dysphagia screen/S&S  DVT ppx: on Eliquis  GI ppx: na  Dispo: [facility]

## 2021-11-22 NOTE — PROGRESS NOTE ADULT - PROBLEM SELECTOR PLAN 3
F: No IVF at this time  E: Replete electrolytes as needed, K>4, M>2  N: dysphagia screen/S&S  DVT ppx: on Eliquis  GI ppx: na  Dispo: [facility] F: No IVF at this time  E: Replete electrolytes as needed, K>4, M>2  N: dysphagia screen/S&S  DVT ppx: on Eliquis  GI ppx: na    Dispo: [facility] PLAN  - c/w Eliquis 5 mg BID F: No IVF at this time  E: Replete electrolytes as needed, K>4, M>2  N: dysphagia screen/S&S  DVT ppx: on Eliquis  GI ppx: na    Dispo: [facility]
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
## 2021-11-22 NOTE — H&P ADULT - PROBLEM SELECTOR PLAN 1
Patient with a couple of weeks of worsening symptoms of dysuria and cloudy urine. Urine culture with proteus UTI.   Has significant allergies to multiple antibiotics, including anaphylactic reactions. Allergies include: Amoxicilllin, Cefepime, Azithromycin, Cipro.  - s/p 1g meropenem in ED  - get  ID approval to continue meropenem  in the AM

## 2021-11-22 NOTE — H&P ADULT - NSHPPHYSICALEXAM_GEN_ALL_CORE
PHYSICAL EXAM:  General: NAD  HEENT: NC/AT; PERRL, clear conjunctiva  Neck: supple  Respiratory: CTA b/l  Cardiovascular: +S1/S2; RRR  Abdomen: soft, NT/ND; +BS x4  Extremities: 2+ peripheral pulses b/l; no LE edema  Skin: normal color and turgor; no rash  Neurological:   Psychiatry: PHYSICAL EXAM:  General: NAD, elderly man, appears frail, very thin  HEENT: NC/AT; PERRL, clear conjunctiva  Neck: supple  Respiratory: CTA b/l  Cardiovascular: +S1/S2; RRR  Abdomen: soft, NT/ND; +BS x4  Extremities: 2+ peripheral pulses b/l; no LE edema  Skin: normal color and turgor; no rash  Neurological: AAOx3   Psychiatry: mood and affect appropriate

## 2021-11-22 NOTE — DIETITIAN INITIAL EVALUATION ADULT. - CALCULATED TO (CAL/KG)
Cath note:  1. Pulmonary hypertension with PA pressure = 52 mm hg.  2. Systemic hypertension.  3. Normal coronary arteries.  4. Good LV systolic function.   [id number]

## 2021-11-22 NOTE — ED PROVIDER NOTE - NSICDXPASTMEDICALHX_GEN_ALL_CORE_FT
PAST MEDICAL HISTORY:  Afib     BPH (benign prostatic hyperplasia)     Inguinal hernia     Pulmonary embolism

## 2021-11-22 NOTE — DIETITIAN NUTRITION RISK NOTIFICATION - ADDITIONAL COMMENTS/DIETITIAN RECOMMENDATIONS
1. Continue soft and bite sized, kosher diet, monitor tolerance   2. Add Ensure Enlive TID (each 350kcal/20gpro)   3. Trend wts   4. Monitor lytes and replete   5. RD to remain available prn

## 2021-11-23 DIAGNOSIS — Z88.0 ALLERGY STATUS TO PENICILLIN: ICD-10-CM

## 2021-11-23 LAB
-  AMPICILLIN/SULBACTAM: SIGNIFICANT CHANGE UP
-  AMPICILLIN: SIGNIFICANT CHANGE UP
-  CEFAZOLIN: SIGNIFICANT CHANGE UP
-  CEFEPIME: SIGNIFICANT CHANGE UP
-  CEFTRIAXONE: SIGNIFICANT CHANGE UP
-  CIPROFLOXACIN: SIGNIFICANT CHANGE UP
-  ERTAPENEM: SIGNIFICANT CHANGE UP
-  GENTAMICIN: SIGNIFICANT CHANGE UP
-  NITROFURANTOIN: SIGNIFICANT CHANGE UP
-  PIPERACILLIN/TAZOBACTAM: SIGNIFICANT CHANGE UP
-  TOBRAMYCIN: SIGNIFICANT CHANGE UP
-  TRIMETHOPRIM/SULFAMETHOXAZOLE: SIGNIFICANT CHANGE UP
COVID-19 NUCLEOCAPSID GAM AB INTERP: NEGATIVE — SIGNIFICANT CHANGE UP
COVID-19 NUCLEOCAPSID TOTAL GAM ANTIBODY RESULT: 0.08 INDEX — SIGNIFICANT CHANGE UP
COVID-19 SPIKE DOMAIN AB INTERP: POSITIVE
COVID-19 SPIKE DOMAIN ANTIBODY RESULT: 46.9 U/ML — HIGH
CULTURE RESULTS: SIGNIFICANT CHANGE UP
METHOD TYPE: SIGNIFICANT CHANGE UP
ORGANISM # SPEC MICROSCOPIC CNT: SIGNIFICANT CHANGE UP
ORGANISM # SPEC MICROSCOPIC CNT: SIGNIFICANT CHANGE UP
SARS-COV-2 IGG+IGM SERPL QL IA: 0.08 INDEX — SIGNIFICANT CHANGE UP
SARS-COV-2 IGG+IGM SERPL QL IA: 46.9 U/ML — HIGH
SARS-COV-2 IGG+IGM SERPL QL IA: NEGATIVE — SIGNIFICANT CHANGE UP
SARS-COV-2 IGG+IGM SERPL QL IA: POSITIVE
SPECIMEN SOURCE: SIGNIFICANT CHANGE UP

## 2021-11-23 PROCEDURE — 99233 SBSQ HOSP IP/OBS HIGH 50: CPT | Mod: GC

## 2021-11-23 PROCEDURE — 99222 1ST HOSP IP/OBS MODERATE 55: CPT

## 2021-11-23 RX ORDER — POLYETHYLENE GLYCOL 3350 17 G/17G
17 POWDER, FOR SOLUTION ORAL EVERY 24 HOURS
Refills: 0 | Status: DISCONTINUED | OUTPATIENT
Start: 2021-11-23 | End: 2021-11-24

## 2021-11-23 RX ORDER — PENICILLIN G POTASSIUM 5000000 [IU]/1
1 POWDER, FOR SOLUTION INTRAMUSCULAR; INTRAPLEURAL; INTRATHECAL; INTRAVENOUS ONCE
Refills: 0 | Status: COMPLETED | OUTPATIENT
Start: 2021-11-23 | End: 2021-11-23

## 2021-11-23 RX ORDER — BENZYLPENICILLOYL POLYLYSINE 0.25 ML
0.25 AMPUL (ML) INTRADERMAL ONCE
Refills: 0 | Status: COMPLETED | OUTPATIENT
Start: 2021-11-23 | End: 2021-11-23

## 2021-11-23 RX ORDER — SENNA PLUS 8.6 MG/1
2 TABLET ORAL EVERY 24 HOURS
Refills: 0 | Status: DISCONTINUED | OUTPATIENT
Start: 2021-11-23 | End: 2021-11-24

## 2021-11-23 RX ORDER — AMOXICILLIN 250 MG/5ML
250 SUSPENSION, RECONSTITUTED, ORAL (ML) ORAL ONCE
Refills: 0 | Status: COMPLETED | OUTPATIENT
Start: 2021-11-23 | End: 2021-11-23

## 2021-11-23 RX ORDER — SODIUM CHLORIDE 9 MG/ML
0.5 INJECTION INTRAMUSCULAR; INTRAVENOUS; SUBCUTANEOUS ONCE
Refills: 0 | Status: COMPLETED | OUTPATIENT
Start: 2021-11-23 | End: 2021-11-23

## 2021-11-23 RX ADMIN — SENNA PLUS 2 TABLET(S): 8.6 TABLET ORAL at 07:33

## 2021-11-23 RX ADMIN — APIXABAN 5 MILLIGRAM(S): 2.5 TABLET, FILM COATED ORAL at 07:33

## 2021-11-23 RX ADMIN — Medication 250 MILLIGRAM(S): at 16:43

## 2021-11-23 RX ADMIN — ERTAPENEM SODIUM 120 MILLIGRAM(S): 1 INJECTION, POWDER, LYOPHILIZED, FOR SOLUTION INTRAMUSCULAR; INTRAVENOUS at 18:38

## 2021-11-23 RX ADMIN — POLYETHYLENE GLYCOL 3350 17 GRAM(S): 17 POWDER, FOR SOLUTION ORAL at 07:33

## 2021-11-23 RX ADMIN — Medication 0.25 MILLILITER(S): at 14:29

## 2021-11-23 RX ADMIN — SODIUM CHLORIDE 0.5 MILLILITER(S): 9 INJECTION INTRAMUSCULAR; INTRAVENOUS; SUBCUTANEOUS at 14:31

## 2021-11-23 RX ADMIN — APIXABAN 5 MILLIGRAM(S): 2.5 TABLET, FILM COATED ORAL at 18:38

## 2021-11-23 RX ADMIN — PENICILLIN G POTASSIUM 1 MILLILITER(S): 5000000 POWDER, FOR SOLUTION INTRAMUSCULAR; INTRAPLEURAL; INTRATHECAL; INTRAVENOUS at 14:31

## 2021-11-23 RX ADMIN — Medication 0.5 MILLILITER(S): at 14:31

## 2021-11-23 RX ADMIN — Medication 1 TABLET(S): at 22:00
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
## 2021-11-23 NOTE — CHART NOTE - NSCHARTNOTEFT_GEN_A_CORE
Penicillin Allergy Skin Testing    DX: Reported PCN drug allergy         1) Informed consent was obtained and time-out was performed.    2) Scratch test: NEGATIVE  * Histamine: 8 mm   * Saline diluent: 0 mm  * Pre-Pen: 0 mm  * Penicillin  mm    3) Intradermal test: NEGATIVE  * Saline diluent: 0 mm  * Pre-pen #1: 0 mm (no growth beyond original size)  * Pre-pen #2: 0 mm (no growth beyond original size)  * Penicillin G #1: 0 mm (no growth beyond original size)  * Penicillin G #2: 0 mm (no growth beyond original size)    Procedure was performed successfully without complications.    Results: Negative Penicillin Allergy Skin Testing    Results were explained to the patient and communicated with primary team.    Procedure was performed by Dr. Diego Viramontes.     Time face to face 60 minutes with >50% on counseling and coordination of care.

## 2021-11-23 NOTE — PROGRESS NOTE ADULT - PROBLEM SELECTOR PLAN 1
Complicated UTI Likely 2/2 Proteus Mirabilis. Patient with a couple of weeks of worsening symptoms of dysuria and cloudy urine. Urine culture with proteus UTI.   Has significant allergies to multiple antibiotics, including anaphylactic reactions. Allergies include: Amoxicilllin, Cefepime, Azithromycin, Cipro. Started with 1g meropenem in ED QD and continued on floors. Started with Meropenem 1g QD, and continued with Ertapenem for 5 more days (through 11/26) per ID recommendation.  PLAN:  - Ertapenem day 2/5 more days (through 11/26)  - Plan to send with PICC line for home infusions as infusion center closed 11/25 for thanksgiving Complicated UTI Likely 2/2 Proteus Mirabilis. Patient with a couple of weeks of worsening symptoms of dysuria and cloudy urine. Urine culture with proteus UTI.   Has significant allergies to multiple antibiotics, including anaphylactic reactions. Allergies include: Amoxicilllin, Cefepime, Azithromycin, Cipro. Started with 1g meropenem in ED QD and continued on floors. Started with Meropenem 1g QD, and continued with Ertapenem for 5 more days (through 11/28) per ID recommendation.  PLAN:  - Ertapenem day 2/7 more days (through 11/28)  - Plan to send with PICC line for home infusions as infusion center closed 11/25 for thanksgiving Complicated UTI Likely 2/2 Proteus Mirabilis. Patient with a couple of weeks of worsening symptoms of dysuria and cloudy urine. Urine culture with proteus UTI.   Has significant allergies to multiple antibiotics, including anaphylactic reactions. Allergies include: Amoxicilllin, Cefepime, Azithromycin, Cipro. Started with 1g meropenem in ED QD and continued on floors. Started with Meropenem 1g QD, and continued with Ertapenem for 5 more days (through 11/28) per ID recommendation.  PLAN:  - Ertapenem day 2/7 more days (through 11/28), if penicillin trials ok transition to amoxicillin regular course tomorrow.   - Plan to send with PICC line for home infusions as infusion center closed 11/25 for thanksgiving

## 2021-11-23 NOTE — CONSULT NOTE ADULT - ATTENDING COMMENTS
Agree with above. Based on his allergy history, Carbapenems are the best treatment option.  Can treat with ertapenem x 7 days.  Can also attempt Penicillin skin testing. If negative, Augmentin would be option to treat this infection or possibly could be used for future infections

## 2021-11-23 NOTE — PROGRESS NOTE ADULT - SUBJECTIVE AND OBJECTIVE BOX
OVERNIGHT EVENTS: NAEO    SUBJECTIVE / INTERVAL HPI: Patient seen and examined at bedside. Patient continues to improve clinically. Patient denying chest pain, SOB, palpitations, cough. Patient denies fever, chills, HA, Dizziness, change in vision/hearing, N/V, abdominal pain, diarrhea, constipation, hematochezia/melena, new onset weakness/numbness, LE pain and/or swelling.    Remaining ROS negative     PHYSICAL EXAM:  GENERAL: NAD, thin, frail elderly man  EYES: EOMI, PERRLA, conjunctiva and sclera clear  ENMT: No tonsillar erythema, exudates, or enlargement  NECK: Supple  CHEST/LUNG: Clear to percussion bilaterally  HEART: Regular rate and rhythm; No murmurs, rubs, or gallops  ABDOMEN: Soft, Nontender, Nondistended; Bowel sounds present  GROIN: No erythema noted, surgical absence of L testicle, R hernia present  EXTREMITIES:  2+ Peripheral Pulses, wearing compression stockings, no swelling    VITAL SIGNS:  T(C): 36.6 (2021 06:32), Max: 36.6 (2021 21:19)  T(F): 97.9 (2021 06:32), Max: 97.9 (2021 21:19)  HR: 60 (2021 06:32) (60 - 79)  BP: 119/76 (2021 06:32) (101/64 - 122/65)  RR: 17 (2021 06:32) (17 - 18)  SpO2: 95% (2021 06:32) (95% - 97%)    MEDICATIONS  (STANDING):  apixaban 5 milliGRAM(s) Oral every 12 hours  ertapenem  IVPB 1000 milliGRAM(s) IV Intermittent every 24 hours  influenza  Vaccine (HIGH DOSE) 0.7 milliLiter(s) IntraMuscular once  polyethylene glycol 3350 17 Gram(s) Oral every 24 hours  senna 2 Tablet(s) Oral every 24 hours    MEDICATIONS  (PRN):    ALLERGIES:  cefepime (Unknown)  Cipro (Short breath)  penicillins (Anaphylaxis)  Zithromax (Short breath)    LABS:                        14.6   4.24  )-----------( 168      ( 2021 01:40 )             42.8   11-    144  |  107  |  29<H>  ----------------------------<  90  3.5   |  23  |  0.62    Ca    10.4      2021 01:40    TPro  7.1  /  Alb  4.0  /  TBili  1.1  /  DBili  x   /  AST  21  /  ALT  13  /  AlkPhos  67  11-22      Urinalysis Basic - ( 2021 00:45 )    Color: Yellow / Appearance: Cloudy / S.025 / pH: x  Gluc: x / Ketone: 15 mg/dL  / Bili: Negative / Urobili: 0.2 E.U./dL   Blood: x / Protein: >=300 mg/dL / Nitrite: NEGATIVE   Leuk Esterase: Large / RBC: < 5 /HPF / WBC Many /HPF   Sq Epi: x / Non Sq Epi: 0-5 /HPF / Bacteria: Present /HPF      RADIOLOGY & ADDITIONAL TESTS: Reviewed.
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
## 2021-11-23 NOTE — PROGRESS NOTE ADULT - PROBLEM SELECTOR PLAN 3
F: No IVF at this time  E: Replete electrolytes as needed, K>4, M>2  N: Soft, kosher diet with Ensure Enlive TID  DVT ppx: on Eliquis  GI ppx: na    Dispo: [facility] PLAN:  - c/w Eliquis 5 mg BID
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
## 2021-11-23 NOTE — PROGRESS NOTE ADULT - PROBLEM SELECTOR PLAN 2
PLAN  - c/w Eliquis 5 mg BID Penicillin skin test negative on 11/23. Pt consented the test. Trial with amoxicillin 250mg PO.   PLAN:  - Tonight amoxicillin 500mg 1 comp trial if well tolerated 250mg.  - Tomorrow can start with amoxicillin 875mg trial if well tolerated 500mg.

## 2021-11-23 NOTE — PROGRESS NOTE ADULT - ATTENDING COMMENTS
Patient seen and examined at bedside. Agree with exam, a/p as above with the following addendum/edits:     Penicillin skin testing negative, will give trial of amoxicillin. If no reaction then will switch to Augmentin with plan for d/c tomorrow.

## 2021-11-24 ENCOUNTER — TRANSCRIPTION ENCOUNTER (OUTPATIENT)
Age: 81
End: 2021-11-24

## 2021-11-24 VITALS
SYSTOLIC BLOOD PRESSURE: 128 MMHG | DIASTOLIC BLOOD PRESSURE: 85 MMHG | RESPIRATION RATE: 18 BRPM | TEMPERATURE: 99 F | HEART RATE: 72 BPM | OXYGEN SATURATION: 95 %

## 2021-11-24 PROCEDURE — 99239 HOSP IP/OBS DSCHRG MGMT >30: CPT | Mod: GC

## 2021-11-24 RX ORDER — APIXABAN 2.5 MG/1
0 TABLET, FILM COATED ORAL
Qty: 0 | Refills: 0 | DISCHARGE

## 2021-11-24 RX ORDER — DOXAZOSIN MESYLATE 4 MG
1 TABLET ORAL AT BEDTIME
Refills: 0 | Status: DISCONTINUED | OUTPATIENT
Start: 2021-11-24 | End: 2021-11-24

## 2021-11-24 RX ORDER — DOXAZOSIN MESYLATE 4 MG
1 TABLET ORAL
Qty: 30 | Refills: 0
Start: 2021-11-24 | End: 2021-12-23

## 2021-11-24 RX ADMIN — Medication 1 TABLET(S): at 15:53

## 2021-11-24 RX ADMIN — APIXABAN 5 MILLIGRAM(S): 2.5 TABLET, FILM COATED ORAL at 06:32

## 2021-11-24 RX ADMIN — INFLUENZA VIRUS VACCINE 0.7 MILLILITER(S): 15; 15; 15; 15 SUSPENSION INTRAMUSCULAR at 15:53

## 2021-11-24 RX ADMIN — POLYETHYLENE GLYCOL 3350 17 GRAM(S): 17 POWDER, FOR SOLUTION ORAL at 06:32

## 2021-11-24 RX ADMIN — Medication 1 TABLET(S): at 09:54

## 2021-11-24 RX ADMIN — SENNA PLUS 2 TABLET(S): 8.6 TABLET ORAL at 06:32

## 2021-11-24 NOTE — DISCHARGE NOTE PROVIDER - NSDCMRMEDTOKEN_GEN_ALL_CORE_FT
acetaminophen-oxyCODONE 325 mg-5 mg oral tablet: 1 tab(s) orally every 6 hours, As Needed, Severe Pain MDD:4  ELIQUIS  5 MG TABS:   Eliquis 5 mg oral tablet: 1 tab(s) orally 2 times a day  ferrous sulfate 325 mg (65 mg elemental iron) oral tablet:  orally   folic acid:  orally   MiraLax oral powder for reconstitution:  orally   senna oral tablet:  orally   Vitamin B12:  orally   Vitamin B6:  orally   Vitamin C:  orally    amoxicillin-clavulanate 500 mg-125 mg oral tablet: 1 tab(s) orally every 8 hours  Eliquis 5 mg oral tablet: 1 tab(s) orally 2 times a day  ferrous sulfate 325 mg (65 mg elemental iron) oral tablet:  orally   folic acid:  orally   MiraLax oral powder for reconstitution:  orally   senna oral tablet:  orally   Vitamin B12:  orally   Vitamin B6:  orally   Vitamin C:  orally    amoxicillin-clavulanate 500 mg-125 mg oral tablet: 1 tab(s) orally every 8 hours  amoxicillin-clavulanate 500 mg-125 mg oral tablet: 1 tab(s) orally every 8 hours  amoxicillin-clavulanate 500 mg-125 mg oral tablet: 1 tab(s) orally every 8 hours  Eliquis 5 mg oral tablet: 1 tab(s) orally 2 times a day  ferrous sulfate 325 mg (65 mg elemental iron) oral tablet:  orally   folic acid:  orally   MiraLax oral powder for reconstitution:  orally   senna oral tablet:  orally   Vitamin B12:  orally   Vitamin B6:  orally   Vitamin C:  orally    amoxicillin-clavulanate 500 mg-125 mg oral tablet: 1 tab(s) orally every 8 hours  doxazosin 1 mg oral tablet: 1 tab(s) orally once a day   Eliquis 5 mg oral tablet: 1 tab(s) orally 2 times a day  ferrous sulfate 325 mg (65 mg elemental iron) oral tablet:  orally   folic acid:  orally   MiraLax oral powder for reconstitution:  orally   senna oral tablet:  orally   Vitamin B12:  orally   Vitamin B6:  orally   Vitamin C:  orally    amoxicillin-clavulanate 500 mg-125 mg oral tablet: 1 tab(s) orally every 8 hours  doxazosin 1 mg oral tablet: 1 tab(s) orally once a day (at bedtime)  doxazosin 1 mg oral tablet: 1 tab(s) orally once a day   Eliquis 5 mg oral tablet: 1 tab(s) orally 2 times a day  ferrous sulfate 325 mg (65 mg elemental iron) oral tablet:  orally   folic acid:  orally   MiraLax oral powder for reconstitution:  orally   senna oral tablet:  orally   Vitamin B12:  orally   Vitamin B6:  orally   Vitamin C:  orally    amoxicillin-clavulanate 500 mg-125 mg oral tablet: 1 tab(s) orally every 8 hours  doxazosin 1 mg oral tablet: 1 tab(s) orally once a day (at bedtime)  Eliquis 5 mg oral tablet: 1 tab(s) orally 2 times a day  ferrous sulfate 325 mg (65 mg elemental iron) oral tablet:  orally   folic acid:  orally   MiraLax oral powder for reconstitution:  orally   senna oral tablet:  orally   Vitamin B12:  orally   Vitamin B6:  orally   Vitamin C:  orally

## 2021-11-24 NOTE — DISCHARGE NOTE PROVIDER - DETAILS OF MALNUTRITION DIAGNOSIS/DIAGNOSES
This patient has been assessed with a concern for Malnutrition and was treated during this hospitalization for the following Nutrition diagnosis/diagnoses:     -  11/22/2021: Severe protein-calorie malnutrition

## 2021-11-24 NOTE — PROGRESS NOTE ADULT - PROBLEM SELECTOR PLAN 4
F: No IVF at this time  E: Replete electrolytes as needed, K>4, M>2  N: Soft, kosher diet with Ensure Enlive TID  DVT ppx: on Eliquis  GI ppx: na    Dispo: [facility]
F: No IVF at this time  E: Replete electrolytes as needed, K>4, M>2  N: Soft, kosher diet with Ensure Enlive TID  DVT ppx: on Eliquis  GI ppx: na    Dispo: [facility]
F: No IVF at this time  E: Replete electrolytes as needed, K>4, M>2  N: dysphagia screen/S&S  DVT ppx: on Eliquis  GI ppx: na    Dispo: [facility]

## 2021-11-24 NOTE — PROGRESS NOTE ADULT - PROBLEM SELECTOR PLAN 1
Complicated UTI Likely 2/2 Proteus Mirabilis. Patient with a couple of weeks of worsening symptoms of dysuria and cloudy urine. Urine culture with proteus UTI.   Has significant allergies to multiple antibiotics, including anaphylactic reactions. Allergies include: Amoxicilllin, Cefepime, Azithromycin, Cipro. Started with 1g meropenem in ED QD and continued on floors. Started with Meropenem 1g QD, and continued with Ertapenem for 5 more days (through 11/28) per ID recommendation. Penicillin skin test negative. Well tolerated amoxicillin 250mg and 500mg dose on 11/23.  PLAN:  - Started with amoxicillin-clavulanic 500/125mg qh8 IV for 5 days (through 11/29)

## 2021-11-24 NOTE — DISCHARGE NOTE PROVIDER - NSDCFUADDAPPT_GEN_ALL_CORE_FT
Please schedule a followup appointment with Allergy to update your status about allergies. (1) Please schedule a followup appointment with Allergy to update your status about allergies.    Please bring your Insurance card, Photo ID, Covid-19 vaccination card (if applicable) and Discharge paperwork to the following appointment:    (2) Please follow up with your Urology Provider, Dr. Deloris Cartagena at [address] [place] [address] on 12/08/2021 at 10:00am.    Appointment was scheduled by Ms. ANGEL Murphy, Referral Coordinator.

## 2021-11-24 NOTE — DISCHARGE NOTE NURSING/CASE MANAGEMENT/SOCIAL WORK - NSDCFUADDAPPT_GEN_ALL_CORE_FT
(1) Please schedule a followup appointment with Allergy to update your status about allergies.    Please bring your Insurance card, Photo ID, Covid-19 vaccination card (if applicable) and Discharge paperwork to the following appointment:    (2) Please follow up with your Urology Provider, Dr. Deloris Cartagena at [address] [place] [address] on 12/08/2021 at 10:00am.    Appointment was scheduled by Ms. ANGEL Murphy, Referral Coordinator.
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
## 2021-11-24 NOTE — DISCHARGE NOTE PROVIDER - PROVIDER TOKENS
PROVIDER:[TOKEN:[[id number]],FOLLOWUP:[2 weeks]] PROVIDER:[TOKEN:[[id number]],SCHEDULEDAPPT:[12/08/2021],SCHEDULEDAPPTTIME:[10:00 AM]]

## 2021-11-24 NOTE — PROGRESS NOTE ADULT - SUBJECTIVE AND OBJECTIVE BOX
OVERNIGHT EVENTS: NAEO    SUBJECTIVE / INTERVAL HPI: Patient seen and examined at bedside. Patient doing well today in the morning after amoxicillin trials. Patient denying chest pain, SOB, palpitations, cough. Patient denies fever, chills, HA, Dizziness, change in vision/hearing, N/V, abdominal pain, diarrhea, constipation, hematochezia/melena, dysuria, hematuria, new onset weakness/numbness, LE pain and/or swelling.    Remaining ROS negative     PHYSICAL EXAM:  GENERAL: NAD, thin, frail elderly man  EYES: EOMI, PERRLA, conjunctiva and sclera clear  ENMT: No tonsillar erythema, exudates, or enlargement  NECK: Supple  CHEST/LUNG: Clear to percussion bilaterally  HEART: Regular rate and rhythm; No murmurs, rubs, or gallops  ABDOMEN: Soft, Nontender, Nondistended; Bowel sounds present  GROIN: No erythema noted, surgical absence of L testicle, R hernia present  EXTREMITIES:  2+ Peripheral Pulses, wearing compression stockings, no swelling    VITAL SIGNS:  Vital Signs Last 24 Hrs  T(C): 37.2 (24 Nov 2021 08:38), Max: 37.2 (24 Nov 2021 08:38)  T(F): 99 (24 Nov 2021 08:38), Max: 99 (24 Nov 2021 08:38)  HR: 72 (24 Nov 2021 08:38) (66 - 72)  BP: 128/85 (24 Nov 2021 08:38) (114/73 - 131/86)  RR: 18 (24 Nov 2021 08:38) (17 - 18)  SpO2: 95% (24 Nov 2021 08:38) (92% - 97%)    MEDICATIONS:  MEDICATIONS  (STANDING):  amoxicillin  500 milliGRAM(s)/clavulanate 1 Tablet(s) Oral every 8 hours  apixaban 5 milliGRAM(s) Oral every 12 hours  doxazosin 1 milliGRAM(s) Oral at bedtime  influenza  Vaccine (HIGH DOSE) 0.7 milliLiter(s) IntraMuscular once  polyethylene glycol 3350 17 Gram(s) Oral every 24 hours  senna 2 Tablet(s) Oral every 24 hours    MEDICATIONS  (PRN):    ALLERGIES:  cefepime (Unknown)  Cipro (Short breath)  NEGATIVE PENICILLIN SKIN ALLERGY TESTING (Unknown)  penicillins (Anaphylaxis)  Zithromax (Short breath)    CAPILLARY BLOOD GLUCOSE    RADIOLOGY & ADDITIONAL TESTS: Reviewed.

## 2021-11-24 NOTE — PROGRESS NOTE ADULT - PROBLEM SELECTOR PROBLEM 1
UTI (urinary tract infection)
no headache

## 2021-11-24 NOTE — DISCHARGE NOTE PROVIDER - NSDCCPCAREPLAN_GEN_ALL_CORE_FT
PRINCIPAL DISCHARGE DIAGNOSIS  Diagnosis: Acute UTI  Assessment and Plan of Treatment: You were admitted to [facility] with a Urinary Tract Infection (UTI) that was found to be positive for a bacteria called Proteus Mirabilis. This was found based on your symptom profile (urinary frequency and/or pain on urination) as well as your urine tested for any infectious organisms. You were given antibiotics throughout your hospital course, starting with an IV antibiotic called Meropenem then switched to another IV antibiotic called Ertapenem. These aren't typically given to patients with UTIs as first-line treatment but given your extensive allergy history (penicillins, cephalosporin, ciprofloxacin), we started you on these medications. During your admission, you were allergy skin tested and found to not have allergies to penicillin at this time as we didn't see any adverse reaction to your penicillin or Amoxicillin in the hospital. Please follow-up with your primary care physician. Please continue to take Amoxicillin upon discharge until November 27th. Should you notice any symptoms such as but not limited to: unrelenting/severe fever (temperatuer >103 F and/or lasting >3 days), worsening pain on urination, urinary discharge, increased urinary frequency, chills, severe flank/lower back pain, or bloody urine, please return to the emergency department for interval evaluation.      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 We used this information, including the sensitivity report and allergy skin testing to guide our antibiotic treatment. Please continue to take Amoxicillin upon discharge until November 27th.     PRINCIPAL DISCHARGE DIAGNOSIS  Diagnosis: Acute UTI  Assessment and Plan of Treatment: You were admitted to [facility] with a Urinary Tract Infection (UTI) that was found to be positive for a bacteria called Proteus Mirabilis. This was found based on your symptom profile (urinary frequency and/or pain on urination) as well as your urine tested for any infectious organisms. You were given antibiotics throughout your hospital course, starting with an IV antibiotic called Meropenem then switched to another IV antibiotic called Ertapenem. These aren't typically given to patients with UTIs as first-line treatment but given your extensive allergy history (penicillins, cephalosporin, ciprofloxacin), we started you on these medications. During your admission, you were allergy skin tested and found to not have allergies to penicillin at this time as we didn't see any adverse reaction to your penicillin or Amoxicillin in the hospital. Please follow-up with your primary care physician. Please continue to take Amoxicillin upon discharge until November 27th. Should you notice any symptoms such as but not limited to: unrelenting/severe fever (temperatuer >103 F and/or lasting >3 days), worsening pain on urination, urinary discharge, increased urinary frequency, chills, severe flank/lower back pain, or bloody urine, please return to the emergency department for interval evaluation.      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 We used this information, including the sensitivity report and allergy skin testing to guide our antibiotic treatment. Please continue to take Amoxicillin upon discharge until November 27th.    Diagnosis: Afib  Assessment and Plan of Treatment: You have a known diagnosis of atrial fibrillation prior to your admission. This condition, if not treated, increases your risk of stroke or heart attack. Please continue to take Eliquis as prescribed. Please make sure to continue taking these medications to avoid developing blood clots and to lower your risk of stroke. Additionally be sure to follow up with your primary care physician (and/or cardiologist) on a regular basis to ensure that this condition does not require changes to the dose or type of medication.    Diagnosis: Constipation  Assessment and Plan of Treatment: We kept you on a bowel regimen of miralax and senna during your admission given your constipation. Please contiune to use these medications as needed for constipation and continue wearing diapers for stool incontinence.     PRINCIPAL DISCHARGE DIAGNOSIS  Diagnosis: Acute UTI  Assessment and Plan of Treatment: You were admitted to [facility] with a Urinary Tract Infection (UTI) that was found to be positive for a bacteria called Proteus Mirabilis. This was found based on your symptom profile (urinary frequency and/or pain on urination) as well as your urine tested for any infectious organisms. You were given antibiotics throughout your hospital course, starting with an IV antibiotic called Meropenem then switched to another IV antibiotic called Ertapenem. These aren't typically given to patients with UTIs as first-line treatment but given your extensive allergy history (penicillins, cephalosporin, ciprofloxacin), we started you on these medications. During your admission, you were allergy skin tested and found to not have allergies to penicillin at this time as we didn't see any adverse reaction to your penicillin or Amoxicillin in the hospital. Please follow-up with your primary care physician. Please continue to take AMOXICILLIN-CLAVULANIC 500/125MG 3 times per day for 5 days upon discharge until November 27th. Should you notice any symptoms such as but not limited to: unrelenting/severe fever (temperatuer >103 F and/or lasting >3 days), worsening pain on urination, urinary discharge, increased urinary frequency, chills, severe flank/lower back pain, or bloody urine, please return to the emergency department for interval evaluation.      SECONDARY DISCHARGE DIAGNOSES  Diagnosis: Allergy status to penicillin  Assessment and Plan of Treatment: You had history of strong allergic reaction to penicillin in 2018. We performed a penicillin allergy skin test during your admission that was negative. Amoxicillin 250mg and 500mg were well tolerated. Please schedule a followup appointment with Allergy to update status.    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 We used this information, including the sensitivity report and allergy skin testing to guide our antibiotic treatment. Please continue to take AMOXICILLIN-CLAVULANIC upon discharge until November 27th.    Diagnosis: Afib  Assessment and Plan of Treatment: You have a known diagnosis of atrial fibrillation prior to your admission. This condition, if not treated, increases your risk of stroke or heart attack. Please continue to take ELIQUIS as prescribed. Please make sure to continue taking these medications to avoid developing blood clots and to lower your risk of stroke. Additionally be sure to follow up with your primary care physician (and/or cardiologist) on a regular basis to ensure that this condition does not require changes to the dose or type of medication.    Diagnosis: Constipation  Assessment and Plan of Treatment: We kept you on a bowel regimen of MIRALAX and SENNA during your admission given your constipation. Please contiune to use these medications as needed for constipation and continue wearing diapers for stool incontinence.

## 2021-11-24 NOTE — DISCHARGE NOTE PROVIDER - CARE PROVIDER_API CALL
Deloris Cartagena)  Female Pelvic MedReconst Surg; Urology  [address] [place] [id number]  Phone: [phone]  Fax: [phone]  Follow Up Time: 2 weeks   Deloris Cartagena)  Female Pelvic MedReconst Surg; Urology  [address] [place] [id number]  Phone: [phone]  Fax: [phone]  Scheduled Appointment: 12/08/2021 10:00 AM

## 2021-11-24 NOTE — DISCHARGE NOTE NURSING/CASE MANAGEMENT/SOCIAL WORK - NSDCVIVACCINE_GEN_ALL_CORE_FT
influenza, high-dose, quadrivalent; 24-Nov-2021 15:53; Jyoti Mitchell (RN); [employer]; [id number] (Exp. Date: 30-Jun-2022); IntraMuscular; Deltoid Right.; 0.7 milliLiter(s); VIS (VIS Published: 06-Aug-2021, VIS Presented: 24-Nov-2021);

## 2021-11-24 NOTE — DISCHARGE NOTE NURSING/CASE MANAGEMENT/SOCIAL WORK - PATIENT PORTAL LINK FT
You can access the FollowMyHealth Patient Portal offered by [facility] by registering at the following website: [email or web address]. By joining [employer]’s FollowMyHealth portal, you will also be able to view your health information using other applications (apps) compatible with our system.

## 2021-11-24 NOTE — PROGRESS NOTE ADULT - PROBLEM SELECTOR PLAN 2
Penicillin skin test negative on 11/23. Pt consented the test. Trial with amoxicillin 250mg and 500mg PO.   PLAN:  - Schedule followup appointment with Allergy.

## 2021-11-27 LAB
CULTURE RESULTS: SIGNIFICANT CHANGE UP
CULTURE RESULTS: SIGNIFICANT CHANGE UP
SPECIMEN SOURCE: SIGNIFICANT CHANGE UP
SPECIMEN SOURCE: SIGNIFICANT CHANGE UP

## 2021-11-29 ENCOUNTER — TRANSCRIPTION ENCOUNTER (OUTPATIENT)
Age: 81
End: 2021-11-29

## 2021-12-03 DIAGNOSIS — N39.0 URINARY TRACT INFECTION, SITE NOT SPECIFIED: ICD-10-CM

## 2021-12-03 DIAGNOSIS — Z87.892 PERSONAL HISTORY OF ANAPHYLAXIS: ICD-10-CM

## 2021-12-03 DIAGNOSIS — E43 UNSPECIFIED SEVERE PROTEIN-CALORIE MALNUTRITION: ICD-10-CM

## 2021-12-03 DIAGNOSIS — R26.2 DIFFICULTY IN WALKING, NOT ELSEWHERE CLASSIFIED: ICD-10-CM

## 2021-12-03 DIAGNOSIS — Z79.01 LONG TERM (CURRENT) USE OF ANTICOAGULANTS: ICD-10-CM

## 2021-12-03 DIAGNOSIS — Z86.711 PERSONAL HISTORY OF PULMONARY EMBOLISM: ICD-10-CM

## 2021-12-03 DIAGNOSIS — B96.4 PROTEUS (MIRABILIS) (MORGANII) AS THE CAUSE OF DISEASES CLASSIFIED ELSEWHERE: ICD-10-CM

## 2021-12-03 DIAGNOSIS — N40.1 BENIGN PROSTATIC HYPERPLASIA WITH LOWER URINARY TRACT SYMPTOMS: ICD-10-CM

## 2021-12-03 DIAGNOSIS — K59.00 CONSTIPATION, UNSPECIFIED: ICD-10-CM

## 2021-12-03 DIAGNOSIS — I48.91 UNSPECIFIED ATRIAL FIBRILLATION: ICD-10-CM

## 2021-12-03 DIAGNOSIS — Z88.1 ALLERGY STATUS TO OTHER ANTIBIOTIC AGENTS STATUS: ICD-10-CM

## 2021-12-03 DIAGNOSIS — Z95.828 PRESENCE OF OTHER VASCULAR IMPLANTS AND GRAFTS: ICD-10-CM

## 2021-12-03 DIAGNOSIS — R32 UNSPECIFIED URINARY INCONTINENCE: ICD-10-CM

## 2021-12-03 DIAGNOSIS — Z88.8 ALLERGY STATUS TO OTHER DRUGS, MEDICAMENTS AND BIOLOGICAL SUBSTANCES STATUS: ICD-10-CM

## 2021-12-03 DIAGNOSIS — Z16.24 RESISTANCE TO MULTIPLE ANTIBIOTICS: ICD-10-CM

## 2021-12-08 ENCOUNTER — APPOINTMENT (OUTPATIENT)
Dept: UROLOGY | Facility: CLINIC | Age: 81
End: 2021-12-08
Payer: MEDICARE

## 2021-12-08 VITALS
HEART RATE: 107 BPM | OXYGEN SATURATION: 98 % | SYSTOLIC BLOOD PRESSURE: 130 MMHG | DIASTOLIC BLOOD PRESSURE: 82 MMHG | TEMPERATURE: 98.1 F

## 2021-12-08 PROCEDURE — 99213 OFFICE O/P EST LOW 20 MIN: CPT

## 2021-12-08 RX ORDER — DOXAZOSIN 1 MG/1
1 TABLET ORAL DAILY
Qty: 30 | Refills: 11 | Status: ACTIVE | COMMUNITY
Start: 2021-12-08 | End: 1900-01-01

## 2021-12-08 NOTE — ASSESSMENT
[FreeTextEntry1] : \par Impression/plan: 81 year-old gentleman recent hospital admission and OAB-wet.\par \par PVR 39 ml\par \par 1. Urine c+s: r/o UTI.\par 2. Continue with Doxazosin 1 mg daily: BPH\par 3. CT urogram: hematuria workup\par 4. F/u UDS/ cysto: assess bladder function and bladder structure.

## 2021-12-08 NOTE — PHYSICAL EXAM
[General Appearance - Well Developed] : well developed [General Appearance - Well Nourished] : well nourished [Normal Appearance] : normal appearance [Well Groomed] : well groomed [General Appearance - In No Acute Distress] : no acute distress [Edema] : no peripheral edema [] : no respiratory distress [Respiration, Rhythm And Depth] : normal respiratory rhythm and effort [Exaggerated Use Of Accessory Muscles For Inspiration] : no accessory muscle use [Oriented To Time, Place, And Person] : oriented to person, place, and time [Affect] : the affect was normal [Mood] : the mood was normal [Not Anxious] : not anxious [Normal Station and Gait] : the gait and station were normal for the patient's age [FreeTextEntry1] : right inguinal hernia palpated and able to reduce. Advised patient to f/u with general surgery.

## 2021-12-10 ENCOUNTER — NON-APPOINTMENT (OUTPATIENT)
Age: 81
End: 2021-12-10

## 2021-12-10 LAB — BACTERIA UR CULT: NORMAL

## 2021-12-15 ENCOUNTER — OUTPATIENT (OUTPATIENT)
Dept: OUTPATIENT SERVICES | Facility: HOSPITAL | Age: 81
LOS: 1 days | End: 2021-12-15

## 2021-12-15 ENCOUNTER — APPOINTMENT (OUTPATIENT)
Dept: INFECTIOUS DISEASE | Facility: CLINIC | Age: 81
End: 2021-12-15
Payer: MEDICARE

## 2021-12-15 VITALS
RESPIRATION RATE: 12 BRPM | WEIGHT: 130 LBS | OXYGEN SATURATION: 99 % | SYSTOLIC BLOOD PRESSURE: 130 MMHG | HEART RATE: 90 BPM | TEMPERATURE: 98 F | HEIGHT: 66 IN | BODY MASS INDEX: 20.89 KG/M2 | DIASTOLIC BLOOD PRESSURE: 80 MMHG

## 2021-12-15 DIAGNOSIS — Z95.828 PRESENCE OF OTHER VASCULAR IMPLANTS AND GRAFTS: Chronic | ICD-10-CM

## 2021-12-15 DIAGNOSIS — E78.5 HYPERLIPIDEMIA, UNSPECIFIED: ICD-10-CM

## 2021-12-15 DIAGNOSIS — K40.90 UNILATERAL INGUINAL HERNIA, W/OUT OBSTRUCTION OR GANGRENE, NOT SPECIFIED AS RECURRENT: ICD-10-CM

## 2021-12-15 DIAGNOSIS — R26.81 UNSTEADINESS ON FEET: ICD-10-CM

## 2021-12-15 DIAGNOSIS — N39.0 URINARY TRACT INFECTION, SITE NOT SPECIFIED: ICD-10-CM

## 2021-12-15 DIAGNOSIS — Z98.89 OTHER SPECIFIED POSTPROCEDURAL STATES: Chronic | ICD-10-CM

## 2021-12-15 DIAGNOSIS — N40.0 BENIGN PROSTATIC HYPERPLASIA WITHOUT LOWER URINARY TRACT SYMPMS: ICD-10-CM

## 2021-12-15 DIAGNOSIS — I48.0 PAROXYSMAL ATRIAL FIBRILLATION: ICD-10-CM

## 2021-12-15 DIAGNOSIS — I26.99 OTHER PULMONARY EMBOLISM W/OUT ACUTE COR PULMONALE: ICD-10-CM

## 2021-12-15 LAB
ALBUMIN SERPL ELPH-MCNC: 4.3 G/DL — SIGNIFICANT CHANGE UP (ref 3.3–5)
ALP SERPL-CCNC: 65 U/L — SIGNIFICANT CHANGE UP (ref 40–120)
ALT FLD-CCNC: 11 U/L — SIGNIFICANT CHANGE UP (ref 10–45)
ANION GAP SERPL CALC-SCNC: 10 MMOL/L — SIGNIFICANT CHANGE UP (ref 5–17)
APPEARANCE UR: CLEAR — SIGNIFICANT CHANGE UP
AST SERPL-CCNC: 15 U/L — SIGNIFICANT CHANGE UP (ref 10–40)
BACTERIA # UR AUTO: PRESENT /HPF
BASOPHILS # BLD AUTO: 0.03 K/UL — SIGNIFICANT CHANGE UP (ref 0–0.2)
BASOPHILS NFR BLD AUTO: 0.7 % — SIGNIFICANT CHANGE UP (ref 0–2)
BILIRUB SERPL-MCNC: 0.7 MG/DL — SIGNIFICANT CHANGE UP (ref 0.2–1.2)
BILIRUB UR-MCNC: NEGATIVE — SIGNIFICANT CHANGE UP
BUN SERPL-MCNC: 21 MG/DL — SIGNIFICANT CHANGE UP (ref 7–23)
CALCIUM SERPL-MCNC: 10.6 MG/DL — HIGH (ref 8.4–10.5)
CHLORIDE SERPL-SCNC: 108 MMOL/L — SIGNIFICANT CHANGE UP (ref 96–108)
CHOLEST SERPL-MCNC: 190 MG/DL — SIGNIFICANT CHANGE UP
CO2 SERPL-SCNC: 28 MMOL/L — SIGNIFICANT CHANGE UP (ref 22–31)
COLOR SPEC: YELLOW — SIGNIFICANT CHANGE UP
COMMENT - URINE: SIGNIFICANT CHANGE UP
CREAT SERPL-MCNC: 0.62 MG/DL — SIGNIFICANT CHANGE UP (ref 0.5–1.3)
DIFF PNL FLD: ABNORMAL
EOSINOPHIL # BLD AUTO: 0.1 K/UL — SIGNIFICANT CHANGE UP (ref 0–0.5)
EOSINOPHIL NFR BLD AUTO: 2.4 % — SIGNIFICANT CHANGE UP (ref 0–6)
EPI CELLS # UR: SIGNIFICANT CHANGE UP /HPF (ref 0–5)
GLUCOSE SERPL-MCNC: 86 MG/DL — SIGNIFICANT CHANGE UP (ref 70–99)
GLUCOSE UR QL: NEGATIVE — SIGNIFICANT CHANGE UP
HCT VFR BLD CALC: 43.7 % — SIGNIFICANT CHANGE UP (ref 39–50)
HDLC SERPL-MCNC: 64 MG/DL — SIGNIFICANT CHANGE UP
HGB BLD-MCNC: 14.5 G/DL — SIGNIFICANT CHANGE UP (ref 13–17)
IMM GRANULOCYTES NFR BLD AUTO: 0.2 % — SIGNIFICANT CHANGE UP (ref 0–1.5)
KETONES UR-MCNC: ABNORMAL MG/DL
LEUKOCYTE ESTERASE UR-ACNC: ABNORMAL
LIPID PNL WITH DIRECT LDL SERPL: 116 MG/DL — HIGH
LYMPHOCYTES # BLD AUTO: 0.95 K/UL — LOW (ref 1–3.3)
LYMPHOCYTES # BLD AUTO: 23.2 % — SIGNIFICANT CHANGE UP (ref 13–44)
MCHC RBC-ENTMCNC: 32.6 PG — SIGNIFICANT CHANGE UP (ref 27–34)
MCHC RBC-ENTMCNC: 33.2 GM/DL — SIGNIFICANT CHANGE UP (ref 32–36)
MCV RBC AUTO: 98.2 FL — SIGNIFICANT CHANGE UP (ref 80–100)
MONOCYTES # BLD AUTO: 0.32 K/UL — SIGNIFICANT CHANGE UP (ref 0–0.9)
MONOCYTES NFR BLD AUTO: 7.8 % — SIGNIFICANT CHANGE UP (ref 2–14)
NEUTROPHILS # BLD AUTO: 2.69 K/UL — SIGNIFICANT CHANGE UP (ref 1.8–7.4)
NEUTROPHILS NFR BLD AUTO: 65.7 % — SIGNIFICANT CHANGE UP (ref 43–77)
NITRITE UR-MCNC: NEGATIVE — SIGNIFICANT CHANGE UP
NON HDL CHOLESTEROL: 126 MG/DL — SIGNIFICANT CHANGE UP
NRBC # BLD: 0 /100 WBCS — SIGNIFICANT CHANGE UP (ref 0–0)
PH UR: 5.5 — SIGNIFICANT CHANGE UP (ref 5–8)
PLATELET # BLD AUTO: 153 K/UL — SIGNIFICANT CHANGE UP (ref 150–400)
POTASSIUM SERPL-MCNC: 4.1 MMOL/L — SIGNIFICANT CHANGE UP (ref 3.5–5.3)
POTASSIUM SERPL-SCNC: 4.1 MMOL/L — SIGNIFICANT CHANGE UP (ref 3.5–5.3)
PROT SERPL-MCNC: 7.1 G/DL — SIGNIFICANT CHANGE UP (ref 6–8.3)
PROT UR-MCNC: NEGATIVE MG/DL — SIGNIFICANT CHANGE UP
RBC # BLD: 4.45 M/UL — SIGNIFICANT CHANGE UP (ref 4.2–5.8)
RBC # FLD: 13.8 % — SIGNIFICANT CHANGE UP (ref 10.3–14.5)
RBC CASTS # UR COMP ASSIST: < 5 /HPF — SIGNIFICANT CHANGE UP
SODIUM SERPL-SCNC: 146 MMOL/L — HIGH (ref 135–145)
SP GR SPEC: 1.02 — SIGNIFICANT CHANGE UP (ref 1–1.03)
TRIGL SERPL-MCNC: 52 MG/DL — SIGNIFICANT CHANGE UP
UROBILINOGEN FLD QL: 0.2 E.U./DL — SIGNIFICANT CHANGE UP
WBC # BLD: 4.1 K/UL — SIGNIFICANT CHANGE UP (ref 3.8–10.5)
WBC # FLD AUTO: 4.1 K/UL — SIGNIFICANT CHANGE UP (ref 3.8–10.5)
WBC UR QL: ABNORMAL /HPF

## 2021-12-15 PROCEDURE — 99205 OFFICE O/P NEW HI 60 MIN: CPT

## 2021-12-16 DIAGNOSIS — R26.81 UNSTEADINESS ON FEET: ICD-10-CM

## 2021-12-16 DIAGNOSIS — I48.0 PAROXYSMAL ATRIAL FIBRILLATION: ICD-10-CM

## 2021-12-16 DIAGNOSIS — K40.90 UNILATERAL INGUINAL HERNIA, WITHOUT OBSTRUCTION OR GANGRENE, NOT SPECIFIED AS RECURRENT: ICD-10-CM

## 2021-12-16 DIAGNOSIS — I26.99 OTHER PULMONARY EMBOLISM WITHOUT ACUTE COR PULMONALE: ICD-10-CM

## 2021-12-16 DIAGNOSIS — N40.0 BENIGN PROSTATIC HYPERPLASIA WITHOUT LOWER URINARY TRACT SYMPTOMS: ICD-10-CM

## 2021-12-16 DIAGNOSIS — E78.5 HYPERLIPIDEMIA, UNSPECIFIED: ICD-10-CM

## 2021-12-16 LAB — T PALLIDUM AB TITR SER: NEGATIVE — SIGNIFICANT CHANGE UP

## 2021-12-16 NOTE — HISTORY OF PRESENT ILLNESS
[FreeTextEntry8] : CC: post hospital follow up for UTI\par \par HPI: 81M w/ BPH, urinary incontinence, inguinal hernia presents after recent hospitalization for UTI / pyelo 2/2 proteus UTI. \par \par Patient presents to me while his PCP is out on medical leave.\par \par He was treated w/ IV abx and transitioned to Augment to complete the course.  He is now feeling better.  No discharge, dysuria, change in urine color.  Is scheduled for cystoscopy and imaging w/ urology.\par \par He is concerned about hypotension -- His blood pressure runs low at home and takes Doxazosin for BPH.  He reports occasional readings in 90s systolic.  Does not feel dizzy / SOB / CP during these episodes.  \par \tricia Uses a walker for stability but has trouble w/ balance.\par \tricia Is concern about eating habits and weight loss - he has poor dentition and eats very slowly, only a little at a time.

## 2021-12-16 NOTE — PLAN
[FreeTextEntry1] : 81M presents after hospital discharge for follow up\par \par UTI / Pyelo: 2/2 proteus.  S/p Augmentin course.  Agreed to retest urine to show for clearance.  Counseled on possibilty that urinary tract is colonized and that since he is asymptomatic, at this time, no further treatment is likely indicated.\par \par Low BP: Stable - no evidence of hypoperfusion, asymptomatic.  Could consider midodrine if indicated but BP is good in office.  C/w doxazosin and counseled on red flag symptoms.\par \par BPH: Follows w/ urology.  Doxazosin helps w/ symptoms.  Due for cystoscopy and imaging ordered by uro.\par \par Hernia: No evidence of incarceration.  To follow up w/ surgeon. \par \par Patient to follow up w/ his PCP in early 2022.\par [facility] PT referral.\par \par I spent more than 60 minutes for the total time of this encounter, including time spent face-to-face with the patient, chart review, coordinating care, and documentation.

## 2021-12-17 ENCOUNTER — NON-APPOINTMENT (OUTPATIENT)
Age: 81
End: 2021-12-17

## 2021-12-17 PROBLEM — N39.0 UTI (URINARY TRACT INFECTION): Status: ACTIVE | Noted: 2021-08-02

## 2021-12-18 LAB
-  AMPICILLIN/SULBACTAM: SIGNIFICANT CHANGE UP
-  AMPICILLIN: SIGNIFICANT CHANGE UP
-  CEFAZOLIN: SIGNIFICANT CHANGE UP
-  CEFEPIME: SIGNIFICANT CHANGE UP
-  CEFTRIAXONE: SIGNIFICANT CHANGE UP
-  CIPROFLOXACIN: SIGNIFICANT CHANGE UP
-  ERTAPENEM: SIGNIFICANT CHANGE UP
-  GENTAMICIN: SIGNIFICANT CHANGE UP
-  NITROFURANTOIN: SIGNIFICANT CHANGE UP
-  PIPERACILLIN/TAZOBACTAM: SIGNIFICANT CHANGE UP
-  TOBRAMYCIN: SIGNIFICANT CHANGE UP
-  TRIMETHOPRIM/SULFAMETHOXAZOLE: SIGNIFICANT CHANGE UP
CULTURE RESULTS: SIGNIFICANT CHANGE UP
METHOD TYPE: SIGNIFICANT CHANGE UP
ORGANISM # SPEC MICROSCOPIC CNT: SIGNIFICANT CHANGE UP
ORGANISM # SPEC MICROSCOPIC CNT: SIGNIFICANT CHANGE UP
SPECIMEN SOURCE: SIGNIFICANT CHANGE UP

## 2021-12-20 ENCOUNTER — NON-APPOINTMENT (OUTPATIENT)
Age: 81
End: 2021-12-20

## 2021-12-20 RX ORDER — AMOXICILLIN AND CLAVULANATE POTASSIUM 500; 125 MG/1; MG/1
500-125 TABLET, FILM COATED ORAL
Qty: 14 | Refills: 0 | Status: ACTIVE | COMMUNITY
Start: 2021-12-20 | End: 1900-01-01

## 2021-12-22 PROCEDURE — 96374 THER/PROPH/DIAG INJ IV PUSH: CPT

## 2021-12-22 PROCEDURE — 87186 SC STD MICRODIL/AGAR DIL: CPT

## 2021-12-22 PROCEDURE — 99285 EMERGENCY DEPT VISIT HI MDM: CPT

## 2021-12-22 PROCEDURE — 85025 COMPLETE CBC W/AUTO DIFF WBC: CPT

## 2021-12-22 PROCEDURE — 87086 URINE CULTURE/COLONY COUNT: CPT

## 2021-12-22 PROCEDURE — 36415 COLL VENOUS BLD VENIPUNCTURE: CPT

## 2021-12-22 PROCEDURE — 85610 PROTHROMBIN TIME: CPT

## 2021-12-22 PROCEDURE — 87635 SARS-COV-2 COVID-19 AMP PRB: CPT

## 2021-12-22 PROCEDURE — 86769 SARS-COV-2 COVID-19 ANTIBODY: CPT

## 2021-12-22 PROCEDURE — 83605 ASSAY OF LACTIC ACID: CPT

## 2021-12-22 PROCEDURE — 85730 THROMBOPLASTIN TIME PARTIAL: CPT

## 2021-12-22 PROCEDURE — 80053 COMPREHEN METABOLIC PANEL: CPT

## 2021-12-22 PROCEDURE — 81001 URINALYSIS AUTO W/SCOPE: CPT

## 2021-12-22 PROCEDURE — 87040 BLOOD CULTURE FOR BACTERIA: CPT

## 2021-12-22 PROCEDURE — 97161 PT EVAL LOW COMPLEX 20 MIN: CPT

## 2021-12-22 PROCEDURE — 90662 IIV NO PRSV INCREASED AG IM: CPT

## 2021-12-22 PROCEDURE — 96376 TX/PRO/DX INJ SAME DRUG ADON: CPT

## 2022-01-12 ENCOUNTER — NON-APPOINTMENT (OUTPATIENT)
Age: 82
End: 2022-01-12

## 2022-03-22 ENCOUNTER — NON-APPOINTMENT (OUTPATIENT)
Age: 82
End: 2022-03-22

## 2022-05-27 ENCOUNTER — APPOINTMENT (OUTPATIENT)
Dept: SURGERY | Facility: CLINIC | Age: 82
End: 2022-05-27

## 2022-09-20 NOTE — ED ADULT NURSE NOTE - NS ED NURSE LEVEL OF CONSCIOUSNESS SPEECH
Central/Diagnostic Scheduling- Please call to schedule your appointment   [phone]        Speaking Coherently

## 2023-04-30 RX ORDER — APIXABAN 5 MG/1
5 TABLET, FILM COATED ORAL
Qty: 180 | Refills: 3 | Status: ACTIVE | COMMUNITY
Start: 2021-06-14 | End: 1900-01-01

## 2023-06-29 NOTE — PHYSICAL THERAPY INITIAL EVALUATION ADULT - GAIT DEVIATIONS NOTED, PT EVAL
Provider pre-printed instructions given decreased gucci/decreased step length/decreased weight-shifting ability